# Patient Record
Sex: MALE | Race: BLACK OR AFRICAN AMERICAN | NOT HISPANIC OR LATINO
[De-identification: names, ages, dates, MRNs, and addresses within clinical notes are randomized per-mention and may not be internally consistent; named-entity substitution may affect disease eponyms.]

---

## 2017-02-03 ENCOUNTER — TRANSCRIPTION ENCOUNTER (OUTPATIENT)
Age: 82
End: 2017-02-03

## 2017-03-22 ENCOUNTER — APPOINTMENT (OUTPATIENT)
Dept: PULMONOLOGY | Facility: CLINIC | Age: 82
End: 2017-03-22

## 2017-03-22 VITALS
WEIGHT: 125 LBS | DIASTOLIC BLOOD PRESSURE: 50 MMHG | HEIGHT: 66 IN | BODY MASS INDEX: 20.09 KG/M2 | RESPIRATION RATE: 12 BRPM | OXYGEN SATURATION: 99 % | TEMPERATURE: 98.3 F | HEART RATE: 88 BPM | SYSTOLIC BLOOD PRESSURE: 120 MMHG

## 2017-03-31 ENCOUNTER — APPOINTMENT (OUTPATIENT)
Dept: PULMONOLOGY | Facility: CLINIC | Age: 82
End: 2017-03-31

## 2017-04-05 ENCOUNTER — APPOINTMENT (OUTPATIENT)
Dept: PULMONOLOGY | Facility: CLINIC | Age: 82
End: 2017-04-05

## 2017-04-05 VITALS
OXYGEN SATURATION: 98 % | HEIGHT: 66 IN | DIASTOLIC BLOOD PRESSURE: 50 MMHG | HEART RATE: 81 BPM | WEIGHT: 125 LBS | TEMPERATURE: 98.3 F | SYSTOLIC BLOOD PRESSURE: 110 MMHG | BODY MASS INDEX: 20.09 KG/M2

## 2017-04-06 ENCOUNTER — FORM ENCOUNTER (OUTPATIENT)
Age: 82
End: 2017-04-06

## 2017-04-07 ENCOUNTER — OUTPATIENT (OUTPATIENT)
Dept: OUTPATIENT SERVICES | Facility: HOSPITAL | Age: 82
LOS: 1 days | End: 2017-04-07
Payer: COMMERCIAL

## 2017-04-07 DIAGNOSIS — Z78.9 OTHER SPECIFIED HEALTH STATUS: Chronic | ICD-10-CM

## 2017-04-07 DIAGNOSIS — Z41.9 ENCOUNTER FOR PROCEDURE FOR PURPOSES OTHER THAN REMEDYING HEALTH STATE, UNSPECIFIED: Chronic | ICD-10-CM

## 2017-04-07 PROCEDURE — 71250 CT THORAX DX C-: CPT

## 2017-04-07 PROCEDURE — 71250 CT THORAX DX C-: CPT | Mod: 26

## 2017-04-12 ENCOUNTER — APPOINTMENT (OUTPATIENT)
Dept: PULMONOLOGY | Facility: CLINIC | Age: 82
End: 2017-04-12

## 2019-07-26 ENCOUNTER — APPOINTMENT (OUTPATIENT)
Dept: PULMONOLOGY | Facility: CLINIC | Age: 84
End: 2019-07-26
Payer: MEDICARE

## 2019-07-26 VITALS
HEIGHT: 66 IN | DIASTOLIC BLOOD PRESSURE: 70 MMHG | TEMPERATURE: 83 F | SYSTOLIC BLOOD PRESSURE: 136 MMHG | OXYGEN SATURATION: 97 % | HEART RATE: 97 BPM

## 2019-07-26 PROCEDURE — 94727 GAS DIL/WSHOT DETER LNG VOL: CPT

## 2019-07-26 PROCEDURE — 94060 EVALUATION OF WHEEZING: CPT

## 2019-07-26 PROCEDURE — 99213 OFFICE O/P EST LOW 20 MIN: CPT | Mod: 25

## 2019-07-26 PROCEDURE — ZZZZZ: CPT

## 2019-07-26 PROCEDURE — 71046 X-RAY EXAM CHEST 2 VIEWS: CPT

## 2019-07-28 NOTE — PHYSICAL EXAM
[General Appearance - Well Developed] : well developed [Normal Appearance] : normal appearance [General Appearance - Well Nourished] : well nourished [No Deformities] : no deformities [Well Groomed] : well groomed [Eyelids - No Xanthelasma] : the eyelids demonstrated no xanthelasmas [Normal Conjunctiva] : the conjunctiva exhibited no abnormalities [General Appearance - In No Acute Distress] : no acute distress [Neck Cervical Mass (___cm)] : no neck mass was observed [Normal Oropharynx] : normal oropharynx [Neck Appearance] : the appearance of the neck was normal [Thyroid Nodule] : there were no palpable thyroid nodules [Jugular Venous Distention Increased] : there was no jugular-venous distention [Thyroid Diffuse Enlargement] : the thyroid was not enlarged [Heart Rate And Rhythm] : heart rate and rhythm were normal [Murmurs] : no murmurs present [Heart Sounds] : normal S1 and S2 [Respiration, Rhythm And Depth] : normal respiratory rhythm and effort [Exaggerated Use Of Accessory Muscles For Inspiration] : no accessory muscle use [Auscultation Breath Sounds / Voice Sounds] : lungs were clear to auscultation bilaterally [Abdomen Soft] : soft [Abdomen Tenderness] : non-tender [Abdomen Mass (___ Cm)] : no abdominal mass palpated [Abnormal Walk] : normal gait [Gait - Sufficient For Exercise Testing] : the gait was sufficient for exercise testing [Cyanosis, Localized] : no localized cyanosis [Nail Clubbing] : no clubbing of the fingernails [Petechial Hemorrhages (___cm)] : no petechial hemorrhages [] : no ischemic changes

## 2019-07-28 NOTE — DISCUSSION/SUMMARY
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

## 2019-07-28 NOTE — HISTORY OF PRESENT ILLNESS
[FreeTextEntry1] : patient is here for follow up complains of dyspnea,  last time I saw him there was radiologic emphysema by his spirometry was normal. inhaler did not help because he has normal flows. he is sent here again.  with dyspnea, inhalers have not helped

## 2020-06-22 ENCOUNTER — APPOINTMENT (OUTPATIENT)
Dept: SURGERY | Facility: CLINIC | Age: 85
End: 2020-06-22
Payer: MEDICARE

## 2020-06-22 VITALS
HEIGHT: 66 IN | SYSTOLIC BLOOD PRESSURE: 137 MMHG | TEMPERATURE: 96.7 F | DIASTOLIC BLOOD PRESSURE: 64 MMHG | BODY MASS INDEX: 19.31 KG/M2 | OXYGEN SATURATION: 99 % | HEART RATE: 79 BPM | WEIGHT: 120.13 LBS

## 2020-06-22 PROCEDURE — 99203 OFFICE O/P NEW LOW 30 MIN: CPT

## 2020-06-22 RX ORDER — SIMVASTATIN 80 MG/1
TABLET, FILM COATED ORAL
Refills: 0 | Status: ACTIVE | COMMUNITY

## 2020-06-22 RX ORDER — VALSARTAN 40 MG/1
TABLET, COATED ORAL
Refills: 0 | Status: ACTIVE | COMMUNITY

## 2020-07-31 NOTE — ASSESSMENT
[FreeTextEntry1] : This is an 84 year old male with a symptomatic inguinal hernia on the left. Prior repair on right. Some medical comorbid. We discussed the risks, benefits and alternatives to open repair of left inguinal hernia with mesh including but not limited to death, disability, need for additional procedures, chronic pain, numbness, scarring, loss of the testicle, infertility, blood clots, bleeding, infection, need for additional procedures and other issues. I answered all questions. The patient does wish to proceed with surgery. He may proceed after appropriate pre operative risk assesment. I answered all questions. Notably greater than 50% of todays 30 minute initial visit was spent on counseling and coordination of his care. \par

## 2020-07-31 NOTE — HISTORY OF PRESENT ILLNESS
[de-identified] : 84 year old male. History of prior hernia repair on the right side about 7-8 years ago. Over past few weeks noted the left side was causing some discomfort and noticed a bulge. Denies any obstructive symptoms. Still fairly active. Has been doing OK through the COVID pandemic. Wants to discuss repair of the right side.

## 2020-07-31 NOTE — PHYSICAL EXAM
[JVD] : no jugular venous distention  [Normal Breath Sounds] : Normal breath sounds [Normal Heart Sounds] : normal heart sounds [Purpura] : no purpura  [Alert] : alert [Oriented to Person] : oriented to person [Oriented to Place] : oriented to place [Oriented to Time] : oriented to time [Calm] : calm [de-identified] : MORENITA. Diane. Appropriate. Comfortable. [de-identified] : Pupils equal. No Scleral Icterus. NCAT. [de-identified] : Supple. Trachea midline. No overt lymphadenopathy. No JVD [de-identified] : Abdomen is soft, non tender and non distended. Do not appreciate any overt mass. There is a left inguinal hernia. Reducible. Not overtly tende.r

## 2020-08-31 ENCOUNTER — APPOINTMENT (OUTPATIENT)
Dept: SURGERY | Facility: CLINIC | Age: 85
End: 2020-08-31
Payer: MEDICARE

## 2020-08-31 VITALS
OXYGEN SATURATION: 99 % | SYSTOLIC BLOOD PRESSURE: 121 MMHG | WEIGHT: 120 LBS | TEMPERATURE: 98.2 F | DIASTOLIC BLOOD PRESSURE: 58 MMHG | BODY MASS INDEX: 19.29 KG/M2 | HEIGHT: 66 IN | HEART RATE: 88 BPM

## 2020-08-31 PROCEDURE — 99213 OFFICE O/P EST LOW 20 MIN: CPT

## 2020-08-31 NOTE — ASSESSMENT
[FreeTextEntry1] : This is an 84 year old male with a symptomatic inguinal hernia on the left. Prior repair on right. Some medical comorbid. We discussed the risks, benefits and alternatives to open repair of open left inguinal hernia with mesh including but not limited to death, disability, need for additional procedures, chronic pain, numbness, scarring, loss of the testicle, infertility, blood clots, bleeding, infection, need for additional procedures and other issues. I answered all questions. The patient does wish to proceed with surgery. He may proceed after appropriate pre operative risk assessment. I answered all questions. Notably greater than 50% of today's 15 minute follow up visit was spent on counseling and coordination of his care. He will likely proceed with surgery given his symptoms. He will figure out a time that works for him. We discussed appropriate pre operative risk assessment. \par

## 2020-08-31 NOTE — HISTORY OF PRESENT ILLNESS
[de-identified] : 84 year old male. History of prior hernia repair on the right side about 7-8 years ago. Over past few weeks noted the left side was causing some discomfort and noticed a bulge. Denies any obstructive symptoms. Still fairly active. Has been doing OK through the COVID pandemic. Wants to discuss repair of the left side.  [de-identified] : 8- - Returns today. Has given some though to hernia repair. Initially wasn’t going to do it, but now it has been bothering him more. He came to discuss further the options. Denies any major changes to his health. His main concern through recovery is the care of his wife who has medical issues, and finding some help from family members to help with groceries and heavy cases of drinking water that he purchases regularly. He has identified his grandson who might help him.

## 2020-08-31 NOTE — PHYSICAL EXAM
[Normal Breath Sounds] : Normal breath sounds [Normal Heart Sounds] : normal heart sounds [Alert] : alert [Oriented to Person] : oriented to person [Oriented to Place] : oriented to place [Oriented to Time] : oriented to time [Calm] : calm [JVD] : no jugular venous distention  [de-identified] : MORENITA. Diane. Appropriate. Comfortable. [Purpura] : no purpura  [de-identified] : Supple. Trachea midline. No overt lymphadenopathy. No JVD [de-identified] : Pupils equal. No Scleral Icterus. NCAT. [de-identified] : Abdomen is soft, non tender and non distended. Do not appreciate any overt mass. There is a left inguinal hernia. Reducible. Not overtly tender. Right sided incision is well healed. No evidence of recurrence.

## 2020-09-16 ENCOUNTER — LABORATORY RESULT (OUTPATIENT)
Age: 85
End: 2020-09-16

## 2020-09-17 ENCOUNTER — TRANSCRIPTION ENCOUNTER (OUTPATIENT)
Age: 85
End: 2020-09-17

## 2020-09-17 VITALS
OXYGEN SATURATION: 99 % | TEMPERATURE: 97 F | HEIGHT: 64 IN | HEART RATE: 76 BPM | WEIGHT: 120.15 LBS | DIASTOLIC BLOOD PRESSURE: 62 MMHG | RESPIRATION RATE: 16 BRPM | SYSTOLIC BLOOD PRESSURE: 127 MMHG

## 2020-09-18 ENCOUNTER — RESULT REVIEW (OUTPATIENT)
Age: 85
End: 2020-09-18

## 2020-09-18 ENCOUNTER — OUTPATIENT (OUTPATIENT)
Dept: OUTPATIENT SERVICES | Facility: HOSPITAL | Age: 85
LOS: 1 days | Discharge: HOME | End: 2020-09-18
Payer: MEDICARE

## 2020-09-18 VITALS
DIASTOLIC BLOOD PRESSURE: 66 MMHG | SYSTOLIC BLOOD PRESSURE: 161 MMHG | HEART RATE: 82 BPM | RESPIRATION RATE: 20 BRPM | OXYGEN SATURATION: 99 %

## 2020-09-18 DIAGNOSIS — Z98.890 OTHER SPECIFIED POSTPROCEDURAL STATES: Chronic | ICD-10-CM

## 2020-09-18 DIAGNOSIS — Z41.9 ENCOUNTER FOR PROCEDURE FOR PURPOSES OTHER THAN REMEDYING HEALTH STATE, UNSPECIFIED: Chronic | ICD-10-CM

## 2020-09-18 DIAGNOSIS — Z78.9 OTHER SPECIFIED HEALTH STATUS: Chronic | ICD-10-CM

## 2020-09-18 PROCEDURE — 49505 PRP I/HERN INIT REDUC >5 YR: CPT | Mod: LT

## 2020-09-18 PROCEDURE — 88302 TISSUE EXAM BY PATHOLOGIST: CPT | Mod: 26

## 2020-09-18 PROCEDURE — C1781: CPT

## 2020-09-18 PROCEDURE — 88302 TISSUE EXAM BY PATHOLOGIST: CPT

## 2020-09-18 RX ORDER — ACETAMINOPHEN 500 MG
650 TABLET ORAL EVERY 6 HOURS
Refills: 0 | Status: DISCONTINUED | OUTPATIENT
Start: 2020-09-18 | End: 2020-09-18

## 2020-09-18 RX ORDER — BUPIVACAINE 13.3 MG/ML
20 INJECTION, SUSPENSION, LIPOSOMAL INFILTRATION ONCE
Refills: 0 | Status: DISCONTINUED | OUTPATIENT
Start: 2020-09-18 | End: 2020-09-18

## 2020-09-18 NOTE — BRIEF OPERATIVE NOTE - OPERATION/FINDINGS
Open repair of left indirect inguinal hernia via 6cm incision along inguinal ligament. Bluntly dissected down to external oblique aponeurosis, then sharply divided. Ilioinguinal nerve identified and preserved. Spermatic cord dissected off hernia sac. Hernia sac suture ligated with 2-0 Vicryl x 1. Indirect inguinal hernia identified and reduced, and Vicryl mesh cut to size, secured with 2-0 Vicryl running sutures. External oblique ring secured with 2-0 Vicryl simple interrupted x 2. Closed external oblique and Prashanth's fascia with 2-0 Vicryl running suture in separate layers. Closed skin with 4-0 Vicryl deep dermal and 4-0 Monocryl running. Dressed with Dermabond.

## 2020-09-18 NOTE — BRIEF OPERATIVE NOTE - NSICDXBRIEFPROCEDURE_GEN_ALL_CORE_FT
PROCEDURES:  Open repair of inguinal hernia using mesh in adult 18-Sep-2020 17:50:56  Liilan Damon

## 2020-09-24 LAB — SURGICAL PATHOLOGY STUDY: SIGNIFICANT CHANGE UP

## 2020-09-28 ENCOUNTER — APPOINTMENT (OUTPATIENT)
Dept: SURGERY | Facility: CLINIC | Age: 85
End: 2020-09-28
Payer: MEDICARE

## 2020-09-28 VITALS
HEIGHT: 66 IN | WEIGHT: 122.25 LBS | OXYGEN SATURATION: 100 % | HEART RATE: 93 BPM | DIASTOLIC BLOOD PRESSURE: 68 MMHG | TEMPERATURE: 97.7 F | SYSTOLIC BLOOD PRESSURE: 111 MMHG | BODY MASS INDEX: 19.65 KG/M2

## 2020-09-28 PROCEDURE — 99024 POSTOP FOLLOW-UP VISIT: CPT

## 2020-09-29 NOTE — HISTORY OF PRESENT ILLNESS
[de-identified] : 84 year old male. History of prior hernia repair on the right side about 7-8 years ago. Over past few weeks noted the left side was causing some discomfort and noticed a bulge. Denies any obstructive symptoms. Still fairly active. Has been doing OK through the COVID pandemic. Wants to discuss repair of the left side.  [de-identified] : 8- - Returns today. Has given some though to hernia repair. Initially wasn’t going to do it, but now it has been bothering him more. He came to discuss further the options. Denies any major changes to his health. His main concern through recovery is the care of his wife who has medical issues, and finding some help from family members to help with groceries and heavy cases of drinking water that he purchases regularly. He has identified his grandson who might help him. \par \par 9- - Returns today. No POD 10 sp open left inguinal hernia repair with mesh. I spoke with him earlier in the week and he was having trouble with constipation. made some recommendations for OTC therapy. He reports that this worked well and he feels fine. No longer constipation. He is concerned the incision is hard. Denies any severe pain. Has been eaitng and drinking without issue.

## 2020-09-29 NOTE — ASSESSMENT
[FreeTextEntry1] : This is an 84 year old male with a symptomatic inguinal hernia on the left. Now s/p open repair. We discussed natural scar maturation - reassured the hardness is normal at this time. We discussed gradual return to normal activity. Would like to see him in about 6 weeks for follow up. I answered all questions.

## 2020-09-29 NOTE — PHYSICAL EXAM
[Normal Breath Sounds] : Normal breath sounds [Normal Heart Sounds] : normal heart sounds [Alert] : alert [Oriented to Person] : oriented to person [Oriented to Place] : oriented to place [Oriented to Time] : oriented to time [Calm] : calm [JVD] : no jugular venous distention  [Purpura] : no purpura  [de-identified] : MORENITA. Diane. Appropriate. Comfortable. [de-identified] : Pupils equal. No Scleral Icterus. NCAT. [de-identified] : Supple. Trachea midline. No overt lymphadenopathy. No JVD [de-identified] : Abdomen is soft, non tender and non distended. Do not appreciate any overt mass. The incision is healing well. No evidence of infection. There is a typical healing ridge.

## 2020-11-16 ENCOUNTER — APPOINTMENT (OUTPATIENT)
Dept: SURGERY | Facility: CLINIC | Age: 85
End: 2020-11-16
Payer: MEDICARE

## 2020-11-16 VITALS
OXYGEN SATURATION: 100 % | HEART RATE: 110 BPM | SYSTOLIC BLOOD PRESSURE: 145 MMHG | HEIGHT: 66 IN | TEMPERATURE: 97.3 F | WEIGHT: 122 LBS | DIASTOLIC BLOOD PRESSURE: 74 MMHG | BODY MASS INDEX: 19.61 KG/M2

## 2020-11-16 DIAGNOSIS — K46.9 UNSPECIFIED ABDOMINAL HERNIA W/OUT OBSTRUCTION OR GANGRENE: ICD-10-CM

## 2020-11-16 DIAGNOSIS — Z87.19 OTHER SPECIFIED POSTPROCEDURAL STATES: ICD-10-CM

## 2020-11-16 DIAGNOSIS — I65.29 OCCLUSION AND STENOSIS OF UNSPECIFIED CAROTID ARTERY: ICD-10-CM

## 2020-11-16 DIAGNOSIS — Z98.890 OTHER SPECIFIED POSTPROCEDURAL STATES: ICD-10-CM

## 2020-11-16 DIAGNOSIS — Z87.891 PERSONAL HISTORY OF NICOTINE DEPENDENCE: ICD-10-CM

## 2020-11-16 PROCEDURE — 99024 POSTOP FOLLOW-UP VISIT: CPT

## 2020-11-16 NOTE — PHYSICAL EXAM
[de-identified] : No acute distress. Appropriate. Comfortable [de-identified] : Normocephalic, atraumatic. No scleral icterus.  [de-identified] : Supple, no JVD or cervical lymphadenopathy.  [de-identified] : Normal breath sounds [de-identified] : S1S1 normal, regular rate and rhythm.  [de-identified] : +BS soft, nontender, nondistended. No hepatosplenomegaly. \par  \par

## 2020-11-16 NOTE — HISTORY OF PRESENT ILLNESS
[de-identified] : Patient is a 86 y/o male presents today for a follow up visit. Patient underwent open left inguinal hernia repair with mesh on 9/18/2020.   Patient saw Dr. Rojas on 10/14/2020 for a post operative visit in which he reported concerns of his incision feeling hard. Today, he states that he continues to be concerned about the hardness of the incision. Reports that the hardness has decreased in size since his last visit. Reports he has been eating and drinking with no issue. Denies any severe pain or constipation.

## 2020-11-16 NOTE — ASSESSMENT
[FreeTextEntry1] : Patient is a 84 y/o male who underwent a open left inguinal hernia repair with mesh . We discussed natural scar maturation with reassurance that hardness is normal at this time. We discussed gradual return to normal activity. All questions were answered. Patient was seen/discussed with attending, Dr. Rojas.

## 2020-11-16 NOTE — PLAN
[FreeTextEntry1] : Patient is a 84 y/o male who underwent a open left inguinal hernia repair on 9/18/2020. We discussed natural scar maturation with reassurance that hardness is normal at this time. We discussed gradual return to normal activity. All questions were answered. Patient was seen/discussed with attending, Dr. Rojas.

## 2020-12-01 ENCOUNTER — APPOINTMENT (OUTPATIENT)
Dept: OTOLARYNGOLOGY | Facility: CLINIC | Age: 85
End: 2020-12-01
Payer: MEDICARE

## 2020-12-01 VITALS
BODY MASS INDEX: 19.09 KG/M2 | HEART RATE: 106 BPM | HEIGHT: 66 IN | DIASTOLIC BLOOD PRESSURE: 72 MMHG | TEMPERATURE: 97.9 F | SYSTOLIC BLOOD PRESSURE: 192 MMHG | WEIGHT: 118.8 LBS

## 2020-12-01 VITALS — DIASTOLIC BLOOD PRESSURE: 70 MMHG | HEART RATE: 100 BPM | SYSTOLIC BLOOD PRESSURE: 131 MMHG

## 2020-12-01 DIAGNOSIS — R20.0 ANESTHESIA OF SKIN: ICD-10-CM

## 2020-12-01 DIAGNOSIS — Z78.9 OTHER SPECIFIED HEALTH STATUS: ICD-10-CM

## 2020-12-01 PROCEDURE — 99072 ADDL SUPL MATRL&STAF TM PHE: CPT

## 2020-12-01 PROCEDURE — 99204 OFFICE O/P NEW MOD 45 MIN: CPT

## 2020-12-01 NOTE — HISTORY OF PRESENT ILLNESS
[de-identified] : 85M here for initial evaluation.\par \par Around 1.5 to 2 months ago, he developed decreased sensation of his right lower lip. This all started after having a crown placed in that area by his dentist and has persisted, unchanged. There is no pain, bleeding or difficulty eating, breathing, swallowing or talking; there is no lip asymmetry or weakness. He returned to his dentist who gave course of amoxicillin and also peridex, but sx remain. He is here for further evaluation.\par \par ROS otherwise unremarkable.

## 2020-12-01 NOTE — PHYSICAL EXAM
[de-identified] : soft, no masses/lesions [Midline] : trachea located in midline position [de-identified] : no masses/lesions [de-identified] : mandibular alveolus mucosalized, nontender; large b/l (left>right) mandibular jovani [de-identified] : no masses/lesions, mucosa intact [de-identified] : no masses/lesions [Normal] : no rashes [FreeTextEntry2] : no masses/lesions [de-identified] : full facial movement; diminished sensation over right V3 by lower right lip and right sublabial mentum

## 2020-12-01 NOTE — CONSULT LETTER
[Dear  ___] : Dear  [unfilled], [Courtesy Letter:] : I had the pleasure of seeing your patient, [unfilled], in my office today. [Consult Closing:] : Thank you very much for allowing me to participate in the care of this patient.  If you have any questions, please do not hesitate to contact me. [Sincerely,] : Sincerely, [FreeTextEntry3] : David Bravo MD\par Department of Otolaryngology - Head and Neck Surgery\par [facility]

## 2021-07-07 ENCOUNTER — OUTPATIENT (OUTPATIENT)
Dept: OUTPATIENT SERVICES | Facility: HOSPITAL | Age: 86
LOS: 1 days | End: 2021-07-07
Payer: MEDICARE

## 2021-07-07 DIAGNOSIS — Z78.9 OTHER SPECIFIED HEALTH STATUS: Chronic | ICD-10-CM

## 2021-07-07 DIAGNOSIS — Z41.9 ENCOUNTER FOR PROCEDURE FOR PURPOSES OTHER THAN REMEDYING HEALTH STATE, UNSPECIFIED: Chronic | ICD-10-CM

## 2021-07-07 DIAGNOSIS — Z98.890 OTHER SPECIFIED POSTPROCEDURAL STATES: Chronic | ICD-10-CM

## 2021-07-07 DIAGNOSIS — R06.09 OTHER FORMS OF DYSPNEA: ICD-10-CM

## 2021-07-07 PROCEDURE — 78452 HT MUSCLE IMAGE SPECT MULT: CPT

## 2021-07-07 PROCEDURE — 93017 CV STRESS TEST TRACING ONLY: CPT

## 2021-07-07 PROCEDURE — 93016 CV STRESS TEST SUPVJ ONLY: CPT

## 2021-07-07 PROCEDURE — 93018 CV STRESS TEST I&R ONLY: CPT

## 2021-07-07 PROCEDURE — 78452 HT MUSCLE IMAGE SPECT MULT: CPT | Mod: 26

## 2021-07-07 PROCEDURE — A9505: CPT

## 2021-07-07 PROCEDURE — A9500: CPT

## 2022-02-08 ENCOUNTER — RESULT REVIEW (OUTPATIENT)
Age: 87
End: 2022-02-08

## 2022-02-08 ENCOUNTER — APPOINTMENT (OUTPATIENT)
Dept: ORTHOPEDIC SURGERY | Facility: CLINIC | Age: 87
End: 2022-02-08
Payer: MEDICARE

## 2022-02-08 ENCOUNTER — OUTPATIENT (OUTPATIENT)
Dept: OUTPATIENT SERVICES | Facility: HOSPITAL | Age: 87
LOS: 1 days | End: 2022-02-08
Payer: MEDICARE

## 2022-02-08 VITALS
HEIGHT: 66 IN | SYSTOLIC BLOOD PRESSURE: 120 MMHG | WEIGHT: 117 LBS | DIASTOLIC BLOOD PRESSURE: 60 MMHG | BODY MASS INDEX: 18.8 KG/M2 | HEART RATE: 99 BPM | OXYGEN SATURATION: 74 %

## 2022-02-08 DIAGNOSIS — Z41.9 ENCOUNTER FOR PROCEDURE FOR PURPOSES OTHER THAN REMEDYING HEALTH STATE, UNSPECIFIED: Chronic | ICD-10-CM

## 2022-02-08 DIAGNOSIS — Z78.9 OTHER SPECIFIED HEALTH STATUS: Chronic | ICD-10-CM

## 2022-02-08 DIAGNOSIS — Z98.890 OTHER SPECIFIED POSTPROCEDURAL STATES: Chronic | ICD-10-CM

## 2022-02-08 PROCEDURE — 73502 X-RAY EXAM HIP UNI 2-3 VIEWS: CPT | Mod: 26,LT

## 2022-02-08 PROCEDURE — 72020 X-RAY EXAM OF SPINE 1 VIEW: CPT

## 2022-02-08 PROCEDURE — 72020 X-RAY EXAM OF SPINE 1 VIEW: CPT | Mod: 26

## 2022-02-08 PROCEDURE — 73502 X-RAY EXAM HIP UNI 2-3 VIEWS: CPT

## 2022-02-08 PROCEDURE — 99204 OFFICE O/P NEW MOD 45 MIN: CPT

## 2022-02-08 RX ORDER — VALSARTAN AND HYDROCHLOROTHIAZIDE 160; 12.5 MG/1; MG/1
160-12.5 TABLET, FILM COATED ORAL
Qty: 90 | Refills: 0 | Status: ACTIVE | COMMUNITY
Start: 2021-08-26

## 2022-02-08 RX ORDER — LIDOCAINE HYDROCHLORIDE 20 MG/ML
2 SOLUTION ORAL; TOPICAL
Qty: 100 | Refills: 0 | Status: ACTIVE | COMMUNITY
Start: 2021-10-22

## 2022-02-08 RX ORDER — FLUTICASONE PROPIONATE 50 UG/1
50 SPRAY, METERED NASAL
Qty: 16 | Refills: 0 | Status: ACTIVE | COMMUNITY
Start: 2021-11-30

## 2022-02-08 NOTE — PHYSICAL EXAM
[de-identified] : General appearance: well nourished and hydrated, pleasant, alert and oriented x 3, cooperative.  Thin habitus.\par HEENT: normocephalic, EOM intact, wearing mask, external auditory canal clear.  \par Cardiovascular: no lower leg edema, no varicosities, dorsalis pedis pulses palpable and symmetric.  \par Lymphatics: no palpable lymphadenopathy, no lymphedema.  \par Neurologic: sensation is normal, no muscle weakness in upper or lower extremities, patella tendon reflexes present and symmetric.  \par Dermatologic: skin moist, warm, no rash.  \par Spine: cervical spine with normal lordosis and painless range of motion, thoracic spine with normal kyphosis and painless range of motion, lumbosacral spine with normal lordosis and restricted painless range of motion.  No tenderness to palpation along midline spine and paraspinal musculature.  Sacroiliac joints nontender bilaterally. Negative SLR and crossed SLR tests bilaterally.\par Gait: antalgic left.\par \par Limb lengths: clinically similar at the medial malleoli, 2-3mm long on the left at the heels\par \par Left hip:\par - Swelling: none\par - Ecchymosis: none\par - Erythema: none\par - Wounds: none\par - Tenderness: anterior\par - ROM: 100 flexion, 0 extension, 5 adduction, 20 abduction, 5 internal rotation, 30 external rotation\par - NICO: painful\par - FADIR: painful\par - Patrick: negative\par - Stinchfield: positive\par - Flexor power: 4+/5\par - Abductor power: 5/5 [de-identified] : A lateral view of the lumbosacral spine, weightbearing AP pelvis, and 2 additional views (frog lateral and false profile) of the hip were interpreted by me and reviewed with the patient.\par \par Location of imaging: [facility]\par Date of exam: 2/8/22\par \par Lumbar spine -- aortic and iliac vessel calcifications noted with vascular stents in place\par Alignment: normal\par Spondylosis: moderate/severe multilevel\par Listhesis: none\par Posterior elements: moderate/severe multilevel facet arthrosis\par \par Pelvic alignment: relative outlet. Bilateral large vessel calcifications noted\par \par Right hip --\par Alignment: normal\par Arthritis: mild\par Deformity: cam\par Osteonecrosis: none \par \par Left hip --\par Alignment: normal\par Arthritis: severe\par Deformity: cam\par Osteonecrosis: primary vs. secondary sclerotic and cystic change

## 2022-02-08 NOTE — HISTORY OF PRESENT ILLNESS
[Worsening] : worsening [___ yrs] : [unfilled] year(s) ago [8] : a current pain level of 8/10 [Sitting] : sitting [Standing] : standing [Constant] : ~He/She~ states the symptoms seem to be constant [de-identified] : 2/8/22: 87y/o male referred by Dr. Garay for evaluation of left hip pain and stiffness. Symptoms have been escalating for about 2 years. Treatments thus far have included PT, HEP, Tylenol, and activity modification. Continues to have severe left groin pain radiating into the left anterior thigh. Ambulatory tolerance now is about 1-2 blocks without assistive device, limited both by pain and cardiovascular fatigue. Recently obtained a walker but is embarrassed to use it. He is here to discuss KRISTINA.\par \par PMH sig for HTN, HLD, and dyspnea. Dyspnea was evaluated to be secondary to aging and deconditioning as per last note from Dr. Holland. Also has undergone bilateral iliac arterial stents and bilateral inguinal hernia repairs.\par \par He reports that his wife is in poor health and requires assistance with her medical appointments. He helps her to arrange these but is finding it more and more difficult to do so given his own physical restrictions. [Bending] : worsened by bending [Hip Movement] : worsened by hip movement [Walking] : worsened by walking [Acetaminophen] : relieved by acetaminophen [de-identified] : describes achy pain

## 2022-02-08 NOTE — CONSULT LETTER
[Dear  ___] : Dear  [unfilled], [Consult Letter:] : I had the pleasure of evaluating your patient, [unfilled]. [Please see my note below.] : Please see my note below. [Consult Closing:] : Thank you very much for allowing me to participate in the care of this patient.  If you have any questions, please do not hesitate to contact me. [Sincerely,] : Sincerely, [FreeTextEntry3] : Mook Hart MD\par Orthopaedic Surgery - Adult Reconstruction\par [facility]\par [address] [place] [address]\par p. [phone]\par f. [phone]

## 2022-02-08 NOTE — DISCUSSION/SUMMARY
[de-identified] : 85y/o male with left hip osteoarthritis\par - He is indicated for left total hip arthroplasty\par - We discussed the details of the procedure, the expected recovery period, and the expected outcome. We discussed the likelihood of satisfaction after complete recovery, and the potential causes of dissatisfaction. The importance of active patient participation in the rehabilitation protocol was emphasized, along with its influence on short and long-term outcomes. We discussed the risks, benefits, and alternatives of surgery at length. Specific risks of total hip replacement were discussed in detail. We discussed the risk of surgical site complications including but not limited to: surgical site infection, wound healing complications, bone fracture, tendon or ligament injury, neurovascular injury, hemorrhage, postoperative stiffness or instability, persistent pain, limb length discrepancy, and need for reoperation. We discussed surgical blood loss and the possible need for blood transfusion. We discussed the risk of perioperative medical complications, including but not limited to catheter-associated urinary tract infection, venous thromboembolism and other cardiopulmonary complications. We discussed anesthetic options and the risk of anesthesia-related complications. We discussed the potential benefits of surgery including the potential to improve the current clinical condition through operative intervention. I emphasized that there are alternatives to surgical intervention including continued conservative management, though such a course could yield less than optimal results in this particular patient. A model was used to demonstrate the operation and to discuss bearing surfaces of the implants. We discussed implant fixation methods; my plan would be to use fully cementless fixation in this case. We discussed the various surgical approaches to the hip; I think that an anterior approach would be appropriate in this case. We discussed the durability of prosthetic hips and limitations related to wear, osteolysis and loosening. All questions were answered to the patient's satisfaction. The patient was given a copy of my preoperative packet with additional information about the procedure. I asked the patient to either call back or schedule a followup appointment for any additional questions or concerns regarding the procedure.\par - Book for surgery at a convenient time; in about 2 months per his request so that he can organize his wife's medical care\par - Standard medical clearance preoperatively through Dr. Garay\par - Tylenol and celecoxib as needed

## 2022-02-14 DIAGNOSIS — M43.12 SPONDYLOLISTHESIS, CERVICAL REGION: ICD-10-CM

## 2022-02-14 DIAGNOSIS — M16.0 BILATERAL PRIMARY OSTEOARTHRITIS OF HIP: ICD-10-CM

## 2022-02-14 DIAGNOSIS — M47.816 SPONDYLOSIS WITHOUT MYELOPATHY OR RADICULOPATHY, LUMBAR REGION: ICD-10-CM

## 2022-04-25 ENCOUNTER — RESULT REVIEW (OUTPATIENT)
Age: 87
End: 2022-04-25

## 2022-04-25 ENCOUNTER — OUTPATIENT (OUTPATIENT)
Dept: OUTPATIENT SERVICES | Facility: HOSPITAL | Age: 87
LOS: 1 days | End: 2022-04-25
Payer: MEDICARE

## 2022-04-25 DIAGNOSIS — Z78.9 OTHER SPECIFIED HEALTH STATUS: Chronic | ICD-10-CM

## 2022-04-25 DIAGNOSIS — Z41.9 ENCOUNTER FOR PROCEDURE FOR PURPOSES OTHER THAN REMEDYING HEALTH STATE, UNSPECIFIED: Chronic | ICD-10-CM

## 2022-04-25 DIAGNOSIS — Z98.890 OTHER SPECIFIED POSTPROCEDURAL STATES: Chronic | ICD-10-CM

## 2022-04-25 PROCEDURE — 71046 X-RAY EXAM CHEST 2 VIEWS: CPT

## 2022-04-25 PROCEDURE — 71046 X-RAY EXAM CHEST 2 VIEWS: CPT | Mod: 26

## 2022-04-27 RX ORDER — POVIDONE-IODINE 5 %
1 AEROSOL (ML) TOPICAL ONCE
Refills: 0 | Status: COMPLETED | OUTPATIENT
Start: 2022-04-28 | End: 2022-04-28

## 2022-04-27 NOTE — H&P ADULT - NSHPPHYSICALEXAM_GEN_ALL_CORE
PE:  Decreased ROM secondary to pain. Rest of PE per medical clearance. MSK: + decreased ROM 2/2 pain, left hip   Skin warm and well perfused, no visible wounds/erythema/ecchymoses  EHL/FHL/TA/GS 5/5 motor strength bilateral lower extremities   SLT in tact to distal bilateral lower extremities   DP pulses palpable bilaterally    Remainder of exam per medical clearance note

## 2022-04-27 NOTE — H&P ADULT - NSHPLABSRESULTS_GEN_ALL_CORE
preop cbc/bmp/coags/ua wnl per medical clearance  Cr 1.11  EKG- SR, LAE  Preop chest x-ray wnl per clearance   covid negative   TTE 4/6/22 EF 65%  Stress MPI 7/7/21 no ischemia

## 2022-04-27 NOTE — H&P ADULT - PROBLEM SELECTOR PLAN 1
Admit to Orthopaedic Service.  Presents today for elective LEFT THR.   Pt medically stable and cleared for procedure today by Dr. Goetz (cardio) and Dr. Muñoz.

## 2022-04-27 NOTE — H&P ADULT - HISTORY OF PRESENT ILLNESS
85yo m c/o left hip pain x   Presents today for elective LEFT THR.  86M c/o left hip pain x chronic. Pt denies preceding trauma or injury. Pt states his pain radiates down his left lower extremity. He denies numbness/tingling of bilateral lower extremities. Pt takes Tylenol as needed for pain control. He does not ambulate with an assistive device at baseline. Denies DVT hx; denies CP, SOB, N/V, tactile fevers, calf pain.    Presents today for elective LEFT THR.

## 2022-04-27 NOTE — H&P ADULT - NSICDXPASTSURGICALHX_GEN_ALL_CORE_FT
PAST SURGICAL HISTORY:  History of hernia surgery     Presence of stent in artery     Surgery, elective Hydrocele

## 2022-04-27 NOTE — H&P ADULT - NSICDXPASTMEDICALHX_GEN_ALL_CORE_FT
PAST MEDICAL HISTORY:  Aortic valve regurgitation     Carotid stenosis left    COPD (chronic obstructive pulmonary disease)     Dermatophytosis of nail    High cholesterol     Hypertension     Inguinal hernia Right    Peripheral vascular disease     Raynaud phenomenon

## 2022-04-28 ENCOUNTER — APPOINTMENT (OUTPATIENT)
Dept: ORTHOPEDIC SURGERY | Facility: HOSPITAL | Age: 87
End: 2022-04-28

## 2022-04-28 ENCOUNTER — INPATIENT (INPATIENT)
Facility: HOSPITAL | Age: 87
LOS: 1 days | Discharge: HOME | DRG: 470 | End: 2022-04-30
Attending: ORTHOPAEDIC SURGERY | Admitting: ORTHOPAEDIC SURGERY
Payer: MEDICARE

## 2022-04-28 VITALS
HEART RATE: 66 BPM | TEMPERATURE: 97 F | RESPIRATION RATE: 16 BRPM | OXYGEN SATURATION: 97 % | DIASTOLIC BLOOD PRESSURE: 64 MMHG | SYSTOLIC BLOOD PRESSURE: 118 MMHG | WEIGHT: 108.25 LBS

## 2022-04-28 DIAGNOSIS — I10 ESSENTIAL (PRIMARY) HYPERTENSION: ICD-10-CM

## 2022-04-28 DIAGNOSIS — Z98.890 OTHER SPECIFIED POSTPROCEDURAL STATES: Chronic | ICD-10-CM

## 2022-04-28 DIAGNOSIS — Z78.9 OTHER SPECIFIED HEALTH STATUS: Chronic | ICD-10-CM

## 2022-04-28 DIAGNOSIS — E78.0 PURE HYPERCHOLESTEROLEMIA: ICD-10-CM

## 2022-04-28 DIAGNOSIS — M19.90 UNSPECIFIED OSTEOARTHRITIS, UNSPECIFIED SITE: ICD-10-CM

## 2022-04-28 DIAGNOSIS — Z41.9 ENCOUNTER FOR PROCEDURE FOR PURPOSES OTHER THAN REMEDYING HEALTH STATE, UNSPECIFIED: Chronic | ICD-10-CM

## 2022-04-28 PROCEDURE — 27130 TOTAL HIP ARTHROPLASTY: CPT | Mod: LT

## 2022-04-28 PROCEDURE — 72170 X-RAY EXAM OF PELVIS: CPT | Mod: 26

## 2022-04-28 DEVICE — STEM FEM AVENIR CMPL HA STD COL SZ 5: Type: IMPLANTABLE DEVICE | Site: LEFT | Status: FUNCTIONAL

## 2022-04-28 DEVICE — LINER ACET VIT E G7 NEUT SZ F 40MM: Type: IMPLANTABLE DEVICE | Site: LEFT | Status: FUNCTIONAL

## 2022-04-28 DEVICE — SCREW SELF TAP 6.5X30MM: Type: IMPLANTABLE DEVICE | Site: LEFT | Status: FUNCTIONAL

## 2022-04-28 DEVICE — SHELL ACET G7 OSSEOTI F HEMISPHR 4 HOLE 54MM: Type: IMPLANTABLE DEVICE | Site: LEFT | Status: FUNCTIONAL

## 2022-04-28 DEVICE — HEAD FEM 40MM -3.5MMBIOLOX DELTA CERAMIC: Type: IMPLANTABLE DEVICE | Site: LEFT | Status: FUNCTIONAL

## 2022-04-28 RX ORDER — POLYETHYLENE GLYCOL 3350 17 G/17G
17 POWDER, FOR SOLUTION ORAL AT BEDTIME
Refills: 0 | Status: DISCONTINUED | OUTPATIENT
Start: 2022-04-28 | End: 2022-04-30

## 2022-04-28 RX ORDER — ASPIRIN/CALCIUM CARB/MAGNESIUM 324 MG
81 TABLET ORAL
Refills: 0 | Status: DISCONTINUED | OUTPATIENT
Start: 2022-04-29 | End: 2022-04-30

## 2022-04-28 RX ORDER — PANTOPRAZOLE 40 MG/1
40 TABLET, DELAYED RELEASE ORAL
Qty: 30 | Refills: 0 | Status: ACTIVE | COMMUNITY
Start: 2022-04-28 | End: 1900-01-01

## 2022-04-28 RX ORDER — CEFAZOLIN SODIUM 1 G
2000 VIAL (EA) INJECTION EVERY 8 HOURS
Refills: 0 | Status: COMPLETED | OUTPATIENT
Start: 2022-04-28 | End: 2022-04-29

## 2022-04-28 RX ORDER — CELECOXIB 100 MG/1
100 CAPSULE ORAL TWICE DAILY
Qty: 42 | Refills: 0 | Status: ACTIVE | COMMUNITY
Start: 2022-04-28 | End: 1900-01-01

## 2022-04-28 RX ORDER — OXYCODONE HYDROCHLORIDE 5 MG/1
5 TABLET ORAL EVERY 4 HOURS
Refills: 0 | Status: DISCONTINUED | OUTPATIENT
Start: 2022-04-28 | End: 2022-04-30

## 2022-04-28 RX ORDER — APREPITANT 80 MG/1
40 CAPSULE ORAL ONCE
Refills: 0 | Status: COMPLETED | OUTPATIENT
Start: 2022-04-28 | End: 2022-04-28

## 2022-04-28 RX ORDER — ONDANSETRON 8 MG/1
4 TABLET, FILM COATED ORAL EVERY 6 HOURS
Refills: 0 | Status: DISCONTINUED | OUTPATIENT
Start: 2022-04-28 | End: 2022-04-30

## 2022-04-28 RX ORDER — HYDROCHLOROTHIAZIDE 25 MG
12.5 TABLET ORAL DAILY
Refills: 0 | Status: DISCONTINUED | OUTPATIENT
Start: 2022-04-29 | End: 2022-04-30

## 2022-04-28 RX ORDER — SENNA PLUS 8.6 MG/1
2 TABLET ORAL AT BEDTIME
Refills: 0 | Status: DISCONTINUED | OUTPATIENT
Start: 2022-04-28 | End: 2022-04-30

## 2022-04-28 RX ORDER — ACETAMINOPHEN 500 MG/1
500 TABLET ORAL
Qty: 180 | Refills: 2 | Status: ACTIVE | COMMUNITY
Start: 2022-04-28 | End: 1900-01-01

## 2022-04-28 RX ORDER — SIMVASTATIN 20 MG/1
20 TABLET, FILM COATED ORAL AT BEDTIME
Refills: 0 | Status: DISCONTINUED | OUTPATIENT
Start: 2022-04-28 | End: 2022-04-30

## 2022-04-28 RX ORDER — KETOROLAC TROMETHAMINE 30 MG/ML
15 SYRINGE (ML) INJECTION EVERY 6 HOURS
Refills: 0 | Status: DISCONTINUED | OUTPATIENT
Start: 2022-04-28 | End: 2022-04-28

## 2022-04-28 RX ORDER — PANTOPRAZOLE SODIUM 20 MG/1
40 TABLET, DELAYED RELEASE ORAL
Refills: 0 | Status: DISCONTINUED | OUTPATIENT
Start: 2022-04-28 | End: 2022-04-30

## 2022-04-28 RX ORDER — ASPIRIN 81 MG/1
81 TABLET ORAL
Qty: 60 | Refills: 0 | Status: ACTIVE | COMMUNITY
Start: 2022-04-28 | End: 1900-01-01

## 2022-04-28 RX ORDER — ACETAMINOPHEN 500 MG
975 TABLET ORAL EVERY 8 HOURS
Refills: 0 | Status: DISCONTINUED | OUTPATIENT
Start: 2022-04-28 | End: 2022-04-30

## 2022-04-28 RX ORDER — VALSARTAN 80 MG/1
160 TABLET ORAL DAILY
Refills: 0 | Status: DISCONTINUED | OUTPATIENT
Start: 2022-04-29 | End: 2022-04-30

## 2022-04-28 RX ORDER — CHLORHEXIDINE GLUCONATE 213 G/1000ML
1 SOLUTION TOPICAL ONCE
Refills: 0 | Status: COMPLETED | OUTPATIENT
Start: 2022-04-28 | End: 2022-04-28

## 2022-04-28 RX ORDER — CELECOXIB 200 MG/1
400 CAPSULE ORAL ONCE
Refills: 0 | Status: COMPLETED | OUTPATIENT
Start: 2022-04-28 | End: 2022-04-28

## 2022-04-28 RX ORDER — SODIUM CHLORIDE 9 MG/ML
1000 INJECTION, SOLUTION INTRAVENOUS
Refills: 0 | Status: DISCONTINUED | OUTPATIENT
Start: 2022-04-29 | End: 2022-04-30

## 2022-04-28 RX ORDER — HYDROMORPHONE HYDROCHLORIDE 2 MG/ML
0.5 INJECTION INTRAMUSCULAR; INTRAVENOUS; SUBCUTANEOUS
Refills: 0 | Status: DISCONTINUED | OUTPATIENT
Start: 2022-04-28 | End: 2022-04-30

## 2022-04-28 RX ORDER — ACETAMINOPHEN 500 MG
1000 TABLET ORAL ONCE
Refills: 0 | Status: COMPLETED | OUTPATIENT
Start: 2022-04-28 | End: 2022-04-28

## 2022-04-28 RX ORDER — OXYCODONE HYDROCHLORIDE 5 MG/1
10 TABLET ORAL EVERY 4 HOURS
Refills: 0 | Status: DISCONTINUED | OUTPATIENT
Start: 2022-04-28 | End: 2022-04-30

## 2022-04-28 RX ORDER — HYDROMORPHONE HYDROCHLORIDE 2 MG/ML
0.5 INJECTION INTRAMUSCULAR; INTRAVENOUS; SUBCUTANEOUS EVERY 4 HOURS
Refills: 0 | Status: DISCONTINUED | OUTPATIENT
Start: 2022-04-28 | End: 2022-04-30

## 2022-04-28 RX ORDER — TRAMADOL HYDROCHLORIDE 50 MG/1
50 TABLET, COATED ORAL
Qty: 60 | Refills: 0 | Status: ACTIVE | COMMUNITY
Start: 2022-04-28 | End: 1900-01-01

## 2022-04-28 RX ORDER — CELECOXIB 200 MG/1
100 CAPSULE ORAL EVERY 12 HOURS
Refills: 0 | Status: DISCONTINUED | OUTPATIENT
Start: 2022-04-28 | End: 2022-04-30

## 2022-04-28 RX ADMIN — Medication 975 MILLIGRAM(S): at 21:25

## 2022-04-28 RX ADMIN — Medication 1 APPLICATION(S): at 08:13

## 2022-04-28 RX ADMIN — Medication 100 MILLIGRAM(S): at 19:15

## 2022-04-28 RX ADMIN — OXYCODONE HYDROCHLORIDE 5 MILLIGRAM(S): 5 TABLET ORAL at 20:38

## 2022-04-28 RX ADMIN — APREPITANT 40 MILLIGRAM(S): 80 CAPSULE ORAL at 08:12

## 2022-04-28 RX ADMIN — CHLORHEXIDINE GLUCONATE 1 APPLICATION(S): 213 SOLUTION TOPICAL at 08:13

## 2022-04-28 RX ADMIN — OXYCODONE HYDROCHLORIDE 5 MILLIGRAM(S): 5 TABLET ORAL at 21:00

## 2022-04-28 RX ADMIN — SIMVASTATIN 20 MILLIGRAM(S): 20 TABLET, FILM COATED ORAL at 21:25

## 2022-04-28 RX ADMIN — Medication 1000 MILLIGRAM(S): at 08:12

## 2022-04-28 RX ADMIN — Medication 975 MILLIGRAM(S): at 21:47

## 2022-04-28 RX ADMIN — CELECOXIB 400 MILLIGRAM(S): 200 CAPSULE ORAL at 08:12

## 2022-04-28 NOTE — PROGRESS NOTE ADULT - SUBJECTIVE AND OBJECTIVE BOX
Ortho Post Op Check    Procedure:  L Anterior KRISTINA   Surgeon: Dr. MAGNUS Hart    Pt comfortable without complaints, pain controlled  Denies CP, SOB, N/V, numbness/tingling     Vital Signs Last 24 Hrs  T(C): 36.8 (04-28-22 @ 20:22), Max: 36.8 (04-28-22 @ 20:22)  T(F): 98.2 (04-28-22 @ 20:22), Max: 98.2 (04-28-22 @ 20:22)  HR: 77 (04-28-22 @ 20:22) (52 - 92)  BP: 154/69 (04-28-22 @ 20:22) (116/58 - 175/76)  BP(mean): 84 (04-28-22 @ 17:30) (83 - 85)  RR: 20 (04-28-22 @ 20:22) (10 - 20)  SpO2: 95% (04-28-22 @ 20:22) (95% - 100%)    General: Pt Alert and oriented, NAD  MIKI DSG C/D/I  Pulses: 2+ DP  Sensation: SILT grossly SPN/DPN/Saph/Kevin/Tib  Motor: 5/5 TA/GS/EHL, Quad/Psoas firing limited 2/2 pain    Post-op X-Ray: hardware intact w/o fracture    A/P: 86yMale s/p L Anterior KRISTINA by Dr. MAGNUS Hart on 04-28  - Stable  - Pain Control  - DVT ppx: ASA 81 BID  - Post op abx: Ancef  - WBS: WBAT  - HPT    Ortho Pager [phone]

## 2022-04-28 NOTE — PHYSICAL THERAPY INITIAL EVALUATION ADULT - PERTINENT HX OF CURRENT PROBLEM, REHAB EVAL
86M c/o left hip pain x chronic. Pt denies preceding trauma or injury. Pt states his pain radiates down his left lower extremity. He denies numbness/tingling of bilateral lower extremities. Pt takes Tylenol as needed for pain control. He does not ambulate with an assistive device at baseline. Denies DVT hx; denies CP, SOB, N/V, tactile fevers, calf pain.

## 2022-04-28 NOTE — ASU PATIENT PROFILE, ADULT - FALL HARM RISK - UNIVERSAL INTERVENTIONS
Bed in lowest position, wheels locked, appropriate side rails in place/Call bell, personal items and telephone in reach/Instruct patient to call for assistance before getting out of bed or chair/Non-slip footwear when patient is out of bed/[place] to call system/Physically safe environment - no spills, clutter or unnecessary equipment/Purposeful Proactive Rounding/Room/bathroom lighting operational, light cord in reach

## 2022-04-28 NOTE — ASU PATIENT PROFILE, ADULT - NSICDXPASTSURGICALHX_GEN_ALL_CORE_FT
PAST SURGICAL HISTORY:  History of hernia surgery BILAT INGUINAL    Presence of stent in artery LEFT LEG    Surgery, elective Hydrocele

## 2022-04-28 NOTE — PHYSICAL THERAPY INITIAL EVALUATION ADULT - ADDITIONAL COMMENTS
Patient lives with spouse in elevator accessible apartment. Does not use any Assistive Devices at baseline. Owns rollator. Denies recent Hx of falls.

## 2022-04-28 NOTE — PHYSICAL THERAPY INITIAL EVALUATION ADULT - GENERAL OBSERVATIONS, REHAB EVAL
Patient received semi-miller in bed  in NAD on RA, +SCDs, +PIV, +Telemetry, +MIKI. Cleared by VERÓNICA Coyne. Agreeable to PT.

## 2022-04-28 NOTE — ASU PATIENT PROFILE, ADULT - NSICDXPASTMEDICALHX_GEN_ALL_CORE_FT
PAST MEDICAL HISTORY:  Aortic valve regurgitation     Carotid stenosis left    COPD (chronic obstructive pulmonary disease)     Dermatophytosis of nail    High cholesterol     Hypertension     Inguinal hernia Right    Osteoarthritis     Peripheral vascular disease     Raynaud phenomenon

## 2022-04-29 ENCOUNTER — TRANSCRIPTION ENCOUNTER (OUTPATIENT)
Age: 87
End: 2022-04-29

## 2022-04-29 LAB
ANION GAP SERPL CALC-SCNC: 9 MMOL/L — SIGNIFICANT CHANGE UP (ref 5–17)
BUN SERPL-MCNC: 34 MG/DL — HIGH (ref 7–23)
CALCIUM SERPL-MCNC: 8.4 MG/DL — SIGNIFICANT CHANGE UP (ref 8.4–10.5)
CHLORIDE SERPL-SCNC: 102 MMOL/L — SIGNIFICANT CHANGE UP (ref 96–108)
CO2 SERPL-SCNC: 25 MMOL/L — SIGNIFICANT CHANGE UP (ref 22–31)
CREAT SERPL-MCNC: 1.15 MG/DL — SIGNIFICANT CHANGE UP (ref 0.5–1.3)
EGFR: 63 ML/MIN/1.73M2 — SIGNIFICANT CHANGE UP
GLUCOSE SERPL-MCNC: 141 MG/DL — HIGH (ref 70–99)
HCT VFR BLD CALC: 29.9 % — LOW (ref 39–50)
HGB BLD-MCNC: 9.9 G/DL — LOW (ref 13–17)
MCHC RBC-ENTMCNC: 33.1 GM/DL — SIGNIFICANT CHANGE UP (ref 32–36)
MCHC RBC-ENTMCNC: 33.4 PG — SIGNIFICANT CHANGE UP (ref 27–34)
MCV RBC AUTO: 101 FL — HIGH (ref 80–100)
NRBC # BLD: 0 /100 WBCS — SIGNIFICANT CHANGE UP (ref 0–0)
PLATELET # BLD AUTO: 145 K/UL — LOW (ref 150–400)
POTASSIUM SERPL-MCNC: 4.7 MMOL/L — SIGNIFICANT CHANGE UP (ref 3.5–5.3)
POTASSIUM SERPL-SCNC: 4.7 MMOL/L — SIGNIFICANT CHANGE UP (ref 3.5–5.3)
RBC # BLD: 2.96 M/UL — LOW (ref 4.2–5.8)
RBC # FLD: 12.7 % — SIGNIFICANT CHANGE UP (ref 10.3–14.5)
SODIUM SERPL-SCNC: 136 MMOL/L — SIGNIFICANT CHANGE UP (ref 135–145)
WBC # BLD: 5.48 K/UL — SIGNIFICANT CHANGE UP (ref 3.8–10.5)
WBC # FLD AUTO: 5.48 K/UL — SIGNIFICANT CHANGE UP (ref 3.8–10.5)

## 2022-04-29 PROCEDURE — 99223 1ST HOSP IP/OBS HIGH 75: CPT

## 2022-04-29 RX ORDER — POLYETHYLENE GLYCOL 3350 17 G/17G
17 POWDER, FOR SOLUTION ORAL
Refills: 0 | Status: DISCONTINUED | OUTPATIENT
Start: 2022-04-29 | End: 2022-04-30

## 2022-04-29 RX ADMIN — CELECOXIB 100 MILLIGRAM(S): 200 CAPSULE ORAL at 05:03

## 2022-04-29 RX ADMIN — Medication 100 MILLIGRAM(S): at 01:41

## 2022-04-29 RX ADMIN — Medication 975 MILLIGRAM(S): at 05:03

## 2022-04-29 RX ADMIN — Medication 975 MILLIGRAM(S): at 23:13

## 2022-04-29 RX ADMIN — Medication 975 MILLIGRAM(S): at 22:13

## 2022-04-29 RX ADMIN — Medication 975 MILLIGRAM(S): at 14:30

## 2022-04-29 RX ADMIN — Medication 81 MILLIGRAM(S): at 18:10

## 2022-04-29 RX ADMIN — POLYETHYLENE GLYCOL 3350 17 GRAM(S): 17 POWDER, FOR SOLUTION ORAL at 22:12

## 2022-04-29 RX ADMIN — POLYETHYLENE GLYCOL 3350 17 GRAM(S): 17 POWDER, FOR SOLUTION ORAL at 18:10

## 2022-04-29 RX ADMIN — POLYETHYLENE GLYCOL 3350 17 GRAM(S): 17 POWDER, FOR SOLUTION ORAL at 13:52

## 2022-04-29 RX ADMIN — OXYCODONE HYDROCHLORIDE 5 MILLIGRAM(S): 5 TABLET ORAL at 16:48

## 2022-04-29 RX ADMIN — Medication 12.5 MILLIGRAM(S): at 05:02

## 2022-04-29 RX ADMIN — Medication 81 MILLIGRAM(S): at 05:03

## 2022-04-29 RX ADMIN — PANTOPRAZOLE SODIUM 40 MILLIGRAM(S): 20 TABLET, DELAYED RELEASE ORAL at 05:03

## 2022-04-29 RX ADMIN — VALSARTAN 160 MILLIGRAM(S): 80 TABLET ORAL at 06:57

## 2022-04-29 RX ADMIN — SIMVASTATIN 20 MILLIGRAM(S): 20 TABLET, FILM COATED ORAL at 22:12

## 2022-04-29 RX ADMIN — Medication 975 MILLIGRAM(S): at 06:00

## 2022-04-29 RX ADMIN — CELECOXIB 100 MILLIGRAM(S): 200 CAPSULE ORAL at 05:45

## 2022-04-29 RX ADMIN — CELECOXIB 100 MILLIGRAM(S): 200 CAPSULE ORAL at 18:10

## 2022-04-29 RX ADMIN — Medication 975 MILLIGRAM(S): at 13:52

## 2022-04-29 NOTE — DISCHARGE NOTE PROVIDER - NSDCACTIVITY_GEN_ALL_CORE
Do not drive or operate machinery/Do not make important decisions/No heavy lifting/straining Do not drive or operate machinery/Do not make important decisions/Walking - Indoors allowed/No heavy lifting/straining/Follow Instructions Provided by your Surgical Team

## 2022-04-29 NOTE — DISCHARGE NOTE PROVIDER - NSDCCPTREATMENT_GEN_ALL_CORE_FT
PRINCIPAL PROCEDURE  Procedure: Replacement, hip, anterior approach  Findings and Treatment: Left

## 2022-04-29 NOTE — CONSULT NOTE ADULT - SUBJECTIVE AND OBJECTIVE BOX
Patient is a 86y old  Male who presents with a chief complaint of left hip pain (29 Apr 2022 12:11)        HPI : 86 year old male admitted to the hospital for Left Hip Arthroplasty , Internal Medicine was consulted for medical management , patient has multiple medical issues , according to him all have been under control for the past few years. His only complaint now is left hip pain . He denies shortness of breath , chest pain , fever , chills , lightheadedness , dizziness , localized weakness, diplopia , nausea , abd pain , vomiting , diarrhea , dysuria, dysphagia , cough       REVIEW OF SYSTEMS: 12 point review of systems negative except those stated else where in the note       PAST MEDICAL & SURGICAL HISTORY:  Hypertension    High cholesterol    Peripheral vascular disease    Raynaud phenomenon    Aortic valve regurgitation    COPD (chronic obstructive pulmonary disease)    Carotid stenosis  left    Dermatophytosis  of nail    Inguinal hernia  Right    Osteoarthritis    Presence of stent in artery  LEFT LEG    Surgery, elective  Hydrocele    History of hernia surgery  BILAT INGUINAL        Social History:  denies tobacco use  + EtOH use - 2 drinks daily (27 Apr 2022 09:26)      FAMILY HISTORY: No family hx of Early CAD , Stroke       Home Medications:  simvastatin 20 mg oral tablet: 1 tab(s) orally once a day (at bedtime) (28 Apr 2022 08:06)  Tylenol 500 mg oral tablet: orally prn, As Needed (28 Apr 2022 08:06)  valsartan-hydrochlorothiazide 160 mg-12.5 mg oral tablet: 1 tab(s) orally once a day (28 Apr 2022 08:06)      MEDICATIONS  (STANDING):  acetaminophen     Tablet .. 975 milliGRAM(s) Oral every 8 hours  aspirin enteric coated 81 milliGRAM(s) Oral two times a day  celecoxib 100 milliGRAM(s) Oral every 12 hours  hydrochlorothiazide 12.5 milliGRAM(s) Oral daily  lactated ringers. 1000 milliLiter(s) (85 mL/Hr) IV Continuous <Continuous>  pantoprazole    Tablet 40 milliGRAM(s) Oral before breakfast  polyethylene glycol 3350 17 Gram(s) Oral at bedtime  polyethylene glycol 3350 17 Gram(s) Oral two times a day  senna 2 Tablet(s) Oral at bedtime  simvastatin 20 milliGRAM(s) Oral at bedtime  valsartan 160 milliGRAM(s) Oral daily    MEDICATIONS  (PRN):  HYDROmorphone  Injectable 0.5 milliGRAM(s) IV Push every 4 hours PRN breakthrough pain  HYDROmorphone  Injectable 0.5 milliGRAM(s) IV Push every 15 minutes PRN breakthrough pain  ondansetron Injectable 4 milliGRAM(s) IV Push every 6 hours PRN Nausea and/or Vomiting  oxyCODONE    IR 5 milliGRAM(s) Oral every 4 hours PRN Moderate Pain (4 - 6)  oxyCODONE    IR 10 milliGRAM(s) Oral every 4 hours PRN Severe Pain (7 - 10)      Vital Signs Last 24 Hrs  T(C): 36.1 (29 Apr 2022 13:02), Max: 36.8 (28 Apr 2022 20:22)  T(F): 97 (29 Apr 2022 13:02), Max: 98.3 (29 Apr 2022 04:44)  HR: 88 (29 Apr 2022 13:02) (44 - 92)  BP: 118/55 (29 Apr 2022 13:02) (109/53 - 175/76)  BP(mean): 84 (28 Apr 2022 17:30) (77 - 85)  RR: 17 (29 Apr 2022 13:02) (10 - 20)  SpO2: 100% (29 Apr 2022 13:02) (95% - 100%)      04-28-22 @ 07:01  -  04-29-22 @ 07:00  --------------------------------------------------------  IN: 850 mL / OUT: 500 mL / NET: 350 mL        LABS:                        9.9    5.48  )-----------( 145      ( 29 Apr 2022 05:24 )             29.9     04-29    136  |  102  |  34<H>  ----------------------------<  141<H>  4.7   |  25  |  1.15    Ca    8.4      29 Apr 2022 05:24      Imaging personally reviewed and radiologists report reviewed     Consultant(s) Notes Reviewed    PHYSICAL EXAM:  GENERAL: not in distress   HEAD, EYES: EOMI, PERRLA, conjunctiva and sclera clear  ENMT:  Moist mucous membranes, Good dentition, No lesions  NECK: Supple, No JVD, Normal thyroid  NERVOUS SYSTEM:  Alert & Oriented X3, Good concentration; Motor Strength 5/5 B/L upper and lower extremities; DTRs 2+ intact and symmetric  CHEST/LUNG: Clear to auscultation  bilaterally; No rales, rhonchi, wheezing,   HEART: Regular rate and rhythm; No murmurs, rubs, or gallops  ABDOMEN: Soft, Nontender, Nondistended; Bowel sounds present  EXTREMITIES:  2+ Peripheral Pulses, No clubbing, cyanosis, or edema  LYMPH: No lymphadenopathy noted  SKIN: No rashes or lesions    Care Discussed with Primary team

## 2022-04-29 NOTE — PROGRESS NOTE ADULT - SUBJECTIVE AND OBJECTIVE BOX
Ortho Floor Note    Subjective:  Pt comfortable without complaints, pain controlled. Was able to ambulate 50 feet with PT yesterday.  Denies CP, SOB, N/V, numbness/tingling     Vital Signs Last 24 Hrs  T(C): 36.8 (29 Apr 2022 04:44), Max: 36.8 (28 Apr 2022 20:22)  T(F): 98.3 (29 Apr 2022 04:44), Max: 98.3 (29 Apr 2022 04:44)  HR: 75 (29 Apr 2022 04:44) (44 - 92)  BP: 149/63 (29 Apr 2022 04:44) (102/54 - 175/76)  BP(mean): 84 (28 Apr 2022 17:30) (75 - 85)  RR: 18 (29 Apr 2022 04:44) (10 - 25)  SpO2: 95% (29 Apr 2022 04:44) (95% - 100%)    General: Pt Alert and oriented, NAD  MIKI DSG C/D/I  Pulses: 2+ DP  Sensation: SILT grossly SPN/DPN/Saph/Kevin/Tib  Motor: 5/5 TA/GS/EHL, Quad/Psoas firing limited 2/2 pain    A/P: 86yMale s/p L Anterior KRISTINA by Dr. MAGNUS Hart on 04-28  - Stable  - Pain Control  - DVT ppx: ASA 81 BID  - WBS: WBAT  - HPT    Ortho Pager [phone]

## 2022-04-29 NOTE — PROGRESS NOTE ADULT - SUBJECTIVE AND OBJECTIVE BOX
Ortho Note      Surgery: Left anterior KRISTINA POD #1  Patient seen and examined at bedside this AM   Patient endorses left hip pain   Feels constipated- no BM since Tuesday  Denies CP, SOB, N/V, numbness/tingling     Vital Signs Last 24 Hrs  T(C): 36 (04-29-22 @ 09:37), Max: 36 (04-29-22 @ 09:37)  T(F): 96.8 (04-29-22 @ 09:37), Max: 96.8 (04-29-22 @ 09:37)  HR: 85 (04-29-22 @ 09:37) (85 - 85)  BP: 116/56 (04-29-22 @ 09:37) (116/56 - 116/56)  BP(mean): --  RR: 18 (04-29-22 @ 09:37) (18 - 18)  SpO2: 100% (04-29-22 @ 09:37) (100% - 100%)  AVSS    General: Pt Alert and oriented, NAD  Dressing C/D/I: MIKI holding suction   Pulses: 2+ DP pulses bilateral LE   Sensation: intact to light touch bilateral LE   Motor: EHL/FHL/TA/GS 5/5 bilaterally         A/P: 86yMale POD#1 s/p L anterior KRISTINA       - Labs, VSS,   - Pain Control: IV and PO PRN   - DVT ppx: 81mg BID   - PT, WBS: WBAT  - Planned for home with HPT when optimized     Ortho Pager [phone] Ortho Note      Surgery: Left anterior KRISTINA POD #1  Patient seen and examined at bedside this AM   Patient endorses left hip pain   Feels constipated- no BM since Tuesday  Denies CP, SOB, N/V, numbness/tingling     Vital Signs Last 24 Hrs  T(C): 36 (04-29-22 @ 09:37), Max: 36 (04-29-22 @ 09:37)  T(F): 96.8 (04-29-22 @ 09:37), Max: 96.8 (04-29-22 @ 09:37)  HR: 85 (04-29-22 @ 09:37) (85 - 85)  BP: 116/56 (04-29-22 @ 09:37) (116/56 - 116/56)  BP(mean): --  RR: 18 (04-29-22 @ 09:37) (18 - 18)  SpO2: 100% (04-29-22 @ 09:37) (100% - 100%)  AVSS    General: Pt Alert and oriented, NAD  Dressing C/D/I: MIKI holding suction   Pulses: 2+ DP pulses bilateral LE   Sensation: intact to light touch bilateral LE   Motor: EHL/FHL/TA/GS 5/5 bilaterally         A/P: 86yMale POD#1 s/p L anterior KRISTINA       - Labs, VSS,   - Pain Control: IV and PO PRN   - DVT ppx: 81mg BID   - Bowel regimen, monitor for retention   - PT, WBS: WBAT  - Planned for home with HPT when optimized     Ortho Pager [phone]

## 2022-04-29 NOTE — DISCHARGE NOTE PROVIDER - NSDCCPCAREPLAN_GEN_ALL_CORE_FT
PRINCIPAL DISCHARGE DIAGNOSIS  Diagnosis: OA (osteoarthritis)  Assessment and Plan of Treatment: Left Hip

## 2022-04-29 NOTE — DISCHARGE NOTE PROVIDER - NSDCMRMEDTOKEN_GEN_ALL_CORE_FT
simvastatin 20 mg oral tablet: 1 tab(s) orally once a day (at bedtime)  Tylenol 500 mg oral tablet: orally prn, As Needed  valsartan-hydrochlorothiazide 160 mg-12.5 mg oral tablet: 1 tab(s) orally once a day   aspirin 81 mg oral delayed release tablet: 1 tab(s) orally 2 times a day MDD:2  oxyCODONE 5 mg oral tablet: 1 tab(s) orally every 4 hours, As needed, severe Pain (7 - 10) MDD:6  pantoprazole 40 mg oral delayed release tablet: 1 tab(s) orally once a day (before a meal)  senna oral tablet: 2 tab(s) orally once a day (at bedtime)  simvastatin 20 mg oral tablet: 1 tab(s) orally once a day (at bedtime)  Tylenol 500 mg oral tablet: orally prn, As Needed  valsartan 160 mg oral tablet: 1 tab(s) orally once a day  valsartan-hydrochlorothiazide 160 mg-12.5 mg oral tablet: 1 tab(s) orally once a day

## 2022-04-29 NOTE — DISCHARGE NOTE PROVIDER - HOSPITAL COURSE
Admit to Orthopaedics for Left Total Hip Replacement 4/28/22  Perioperative Antibiotics  DVT prophylaxis  Physical Therapy  Pain Management Admit to Orthopaedics for Left Total Hip Replacement 4/28/22  Perioperative Antibiotics  DVT prophylaxis  Physical Therapy  Pain Management   Med Consult

## 2022-04-29 NOTE — CONSULT NOTE ADULT - ASSESSMENT
86 year old male admitted to the hospital for Left Hip Replacement     Impression and Plan   # Left Hip Arthroplasty on 4/28  - pain control , DVT prophylaxis , Weightbearing status , Dressing changes and drain care per ortho team    # HTN   - continue valsartan and hctz , but will monitor serum creatinine , bun and orthostatic vitals , if any of them are abnormal would discontinue HCTZ     # HLD   -Continue simvastatin     # Raynaud Phenomenon  # Peripheral Vascular disease   # Carotid stenosis   -continue aspirin and statin     # Hx of Aortic valve Regurgitation     # COPD without exacerbation   -Duoneb PRN     # Daily alcohol use without hx of Alcohol withdrawal , intermediate risk for severe alcohol withdrawal , does not need CIWA-Ar Protocol

## 2022-04-29 NOTE — DISCHARGE NOTE PROVIDER - NSDCFUSCHEDAPPT_GEN_ALL_CORE_FT
Mook Hart  [facility] Physician [facility]  OrthoSurg [address]  Scheduled Appointment: 05/18/2022

## 2022-04-29 NOTE — PATIENT PROFILE ADULT - FALL HARM RISK - HARM RISK INTERVENTIONS

## 2022-04-29 NOTE — DISCHARGE NOTE PROVIDER - CARE PROVIDER_API CALL
Mook Hart)  Orthopedics  [address] [facility], [place] [address]  Phone: [phone]  Fax: [phone]  Follow Up Time:

## 2022-04-29 NOTE — DISCHARGE NOTE PROVIDER - NSDCFUADDINST_GEN_ALL_CORE_FT
You have a MIKI dressing. It is water resistant, but not waterproof. You may shower; however, the pump should be disconnected and placed in a safe location where it will not get wet.  No soaking in bathtubs.  The dressing has a battery that usually dies in 7 days. Once this occurs, you may remove the dressing and dispose of it, then leave incision open to air. Keep incision clean and dry.     No strenuous activity, heavy lifting, driving or returning to work until cleared by MD.  Please see Dr. Hart's attached discharge instructions       Swelling may travel all the way down leg to foot, this is normal and will subside in a few weeks.  Follow up with Dr. Hart to schedule an appt, if you have staples or sutures they will be removed in office.  Contact your doctor if you experience: fever greater than 101.5, chills, chest pain, difficulty breathing, redness or excessive drainage around the incision, other concerns.

## 2022-04-30 ENCOUNTER — TRANSCRIPTION ENCOUNTER (OUTPATIENT)
Age: 87
End: 2022-04-30

## 2022-04-30 VITALS
TEMPERATURE: 97 F | SYSTOLIC BLOOD PRESSURE: 120 MMHG | DIASTOLIC BLOOD PRESSURE: 58 MMHG | RESPIRATION RATE: 17 BRPM | HEART RATE: 69 BPM | OXYGEN SATURATION: 100 %

## 2022-04-30 LAB
ANION GAP SERPL CALC-SCNC: 10 MMOL/L — SIGNIFICANT CHANGE UP (ref 5–17)
BUN SERPL-MCNC: 40 MG/DL — HIGH (ref 7–23)
CALCIUM SERPL-MCNC: 8.8 MG/DL — SIGNIFICANT CHANGE UP (ref 8.4–10.5)
CHLORIDE SERPL-SCNC: 104 MMOL/L — SIGNIFICANT CHANGE UP (ref 96–108)
CO2 SERPL-SCNC: 23 MMOL/L — SIGNIFICANT CHANGE UP (ref 22–31)
CREAT SERPL-MCNC: 1.13 MG/DL — SIGNIFICANT CHANGE UP (ref 0.5–1.3)
EGFR: 64 ML/MIN/1.73M2 — SIGNIFICANT CHANGE UP
GLUCOSE SERPL-MCNC: 109 MG/DL — HIGH (ref 70–99)
HCT VFR BLD CALC: 30.7 % — LOW (ref 39–50)
HGB BLD-MCNC: 9.8 G/DL — LOW (ref 13–17)
MCHC RBC-ENTMCNC: 31.9 GM/DL — LOW (ref 32–36)
MCHC RBC-ENTMCNC: 33 PG — SIGNIFICANT CHANGE UP (ref 27–34)
MCV RBC AUTO: 103.4 FL — HIGH (ref 80–100)
NRBC # BLD: 0 /100 WBCS — SIGNIFICANT CHANGE UP (ref 0–0)
PLATELET # BLD AUTO: 139 K/UL — LOW (ref 150–400)
POTASSIUM SERPL-MCNC: 4.4 MMOL/L — SIGNIFICANT CHANGE UP (ref 3.5–5.3)
POTASSIUM SERPL-SCNC: 4.4 MMOL/L — SIGNIFICANT CHANGE UP (ref 3.5–5.3)
RBC # BLD: 2.97 M/UL — LOW (ref 4.2–5.8)
RBC # FLD: 13.1 % — SIGNIFICANT CHANGE UP (ref 10.3–14.5)
SODIUM SERPL-SCNC: 137 MMOL/L — SIGNIFICANT CHANGE UP (ref 135–145)
WBC # BLD: 6.27 K/UL — SIGNIFICANT CHANGE UP (ref 3.8–10.5)
WBC # FLD AUTO: 6.27 K/UL — SIGNIFICANT CHANGE UP (ref 3.8–10.5)

## 2022-04-30 PROCEDURE — 85027 COMPLETE CBC AUTOMATED: CPT

## 2022-04-30 PROCEDURE — 76000 FLUOROSCOPY <1 HR PHYS/QHP: CPT

## 2022-04-30 PROCEDURE — 36415 COLL VENOUS BLD VENIPUNCTURE: CPT

## 2022-04-30 PROCEDURE — 99232 SBSQ HOSP IP/OBS MODERATE 35: CPT

## 2022-04-30 PROCEDURE — 86900 BLOOD TYPING SEROLOGIC ABO: CPT

## 2022-04-30 PROCEDURE — 97116 GAIT TRAINING THERAPY: CPT

## 2022-04-30 PROCEDURE — 97161 PT EVAL LOW COMPLEX 20 MIN: CPT

## 2022-04-30 PROCEDURE — 86850 RBC ANTIBODY SCREEN: CPT

## 2022-04-30 PROCEDURE — 80048 BASIC METABOLIC PNL TOTAL CA: CPT

## 2022-04-30 PROCEDURE — 72170 X-RAY EXAM OF PELVIS: CPT

## 2022-04-30 PROCEDURE — C1713: CPT

## 2022-04-30 PROCEDURE — C1776: CPT

## 2022-04-30 PROCEDURE — 86901 BLOOD TYPING SEROLOGIC RH(D): CPT

## 2022-04-30 RX ORDER — PANTOPRAZOLE SODIUM 20 MG/1
1 TABLET, DELAYED RELEASE ORAL
Qty: 30 | Refills: 0
Start: 2022-04-30 | End: 2022-05-29

## 2022-04-30 RX ORDER — OXYCODONE HYDROCHLORIDE 5 MG/1
1 TABLET ORAL
Qty: 42 | Refills: 0
Start: 2022-04-30 | End: 2022-05-06

## 2022-04-30 RX ORDER — ASPIRIN/CALCIUM CARB/MAGNESIUM 324 MG
1 TABLET ORAL
Qty: 60 | Refills: 0
Start: 2022-04-30 | End: 2022-05-29

## 2022-04-30 RX ORDER — VALSARTAN 80 MG/1
1 TABLET ORAL
Qty: 0 | Refills: 0 | DISCHARGE
Start: 2022-04-30

## 2022-04-30 RX ORDER — SENNA PLUS 8.6 MG/1
2 TABLET ORAL
Qty: 0 | Refills: 0 | DISCHARGE
Start: 2022-04-30

## 2022-04-30 RX ADMIN — Medication 975 MILLIGRAM(S): at 06:57

## 2022-04-30 RX ADMIN — POLYETHYLENE GLYCOL 3350 17 GRAM(S): 17 POWDER, FOR SOLUTION ORAL at 06:00

## 2022-04-30 RX ADMIN — CELECOXIB 100 MILLIGRAM(S): 200 CAPSULE ORAL at 06:00

## 2022-04-30 RX ADMIN — Medication 975 MILLIGRAM(S): at 05:59

## 2022-04-30 RX ADMIN — PANTOPRAZOLE SODIUM 40 MILLIGRAM(S): 20 TABLET, DELAYED RELEASE ORAL at 05:59

## 2022-04-30 RX ADMIN — CELECOXIB 100 MILLIGRAM(S): 200 CAPSULE ORAL at 07:00

## 2022-04-30 RX ADMIN — Medication 81 MILLIGRAM(S): at 06:00

## 2022-04-30 NOTE — DISCHARGE NOTE NURSING/CASE MANAGEMENT/SOCIAL WORK - NSDCPEFALRISK_GEN_ALL_CORE
For information on Fall & Injury Prevention, visit: [email or web address] OR  [email or web address] OR  https://www.cdc.gov/steadi/patient.html

## 2022-04-30 NOTE — PROGRESS NOTE ADULT - SUBJECTIVE AND OBJECTIVE BOX
Patient is a 86y old  Male who presents with a chief complaint of left hip pain (29 Apr 2022 13:20)      INTERVAL HPI:  OVERNIGHT EVENTS:  T(F): 97.6 (04-30-22 @ 05:47), Max: 97.7 (04-29-22 @ 21:18)  HR: 70 (04-30-22 @ 05:47) (70 - 88)  BP: 119/58 (04-30-22 @ 05:47) (109/54 - 155/67)  RR: 17 (04-30-22 @ 05:47) (13 - 18)  SpO2: 98% (04-30-22 @ 05:47) (98% - 100%)  I&O's Summary    29 Apr 2022 07:01  -  30 Apr 2022 07:00  --------------------------------------------------------  IN: 0 mL / OUT: 1400 mL / NET: -1400 mL        REVIEW OF SYSTEMS:  CONSTITUTIONAL: No fever, weight loss, or fatigue  EYES: No eye pain, visual disturbances, or discharge  ENMT:  No difficulty hearing, tinnitus, vertigo; No sinus or throat pain  NECK: No pain or stiffness  BREASTS: No pain, masses, or nipple discharge  RESPIRATORY: No cough, wheezing, chills or hemoptysis; No shortness of breath  CARDIOVASCULAR: No chest pain, palpitations, dizziness, or leg swelling  GASTROINTESTINAL: No abdominal or epigastric pain. No nausea, vomiting, or hematemesis; No diarrhea or constipation. No melena or hematochezia.  GENITOURINARY: No dysuria, frequency, hematuria, or incontinence  NEUROLOGICAL: No headaches, memory loss, loss of strength, numbness, or tremors  SKIN: No itching, burning, rashes, or lesions   LYMPH NODES: No enlarged glands  ENDOCRINE: No heat or cold intolerance; No hair loss  MUSCULOSKELETAL: No joint pain or swelling; No muscle, back, or extremity pain  PSYCHIATRIC: No depression, anxiety, mood swings, or difficulty sleeping  HEME/LYMPH: No easy bruising, or bleeding gums  ALLERY AND IMMUNOLOGIC: No hives or eczema    PHYSICAL EXAM:  GENERAL: NAD, well-groomed, well-developed  HEAD:  Atraumatic, Normocephalic  EYES: EOMI, PERRLA, conjunctiva and sclera clear  ENMT: No tonsillar erythema, exudates, or enlargement; Moist mucous membranes, Good dentition, No lesions  NECK: Supple, No JVD, Normal thyroid  NERVOUS SYSTEM:  Alert & Oriented X3, Good concentration; Motor Strength 5/5 B/L upper and lower extremities; DTRs 2+ intact and symmetric  CHEST/LUNG: Clear to percussion bilaterally; No rales, rhonchi, wheezing, or rubs  HEART: Regular rate and rhythm; No murmurs, rubs, or gallops  ABDOMEN: Soft, Nontender, Nondistended; Bowel sounds present  EXTREMITIES:  2+ Peripheral Pulses, No clubbing, cyanosis, or edema  LYMPH: No lymphadenopathy noted  SKIN: No rashes or lesions    LABS:                        9.8    6.27  )-----------( 139      ( 30 Apr 2022 07:03 )             30.7     04-29    136  |  102  |  34<H>  ----------------------------<  141<H>  4.7   |  25  |  1.15    Ca    8.4      29 Apr 2022 05:24          CAPILLARY BLOOD GLUCOSE                  MEDICATIONS  (STANDING):  acetaminophen     Tablet .. 975 milliGRAM(s) Oral every 8 hours  aspirin enteric coated 81 milliGRAM(s) Oral two times a day  celecoxib 100 milliGRAM(s) Oral every 12 hours  hydrochlorothiazide 12.5 milliGRAM(s) Oral daily  lactated ringers. 1000 milliLiter(s) (85 mL/Hr) IV Continuous <Continuous>  pantoprazole    Tablet 40 milliGRAM(s) Oral before breakfast  polyethylene glycol 3350 17 Gram(s) Oral at bedtime  polyethylene glycol 3350 17 Gram(s) Oral two times a day  senna 2 Tablet(s) Oral at bedtime  simvastatin 20 milliGRAM(s) Oral at bedtime  valsartan 160 milliGRAM(s) Oral daily    MEDICATIONS  (PRN):  bisacodyl Suppository 10 milliGRAM(s) Rectal once PRN Constipation  HYDROmorphone  Injectable 0.5 milliGRAM(s) IV Push every 4 hours PRN breakthrough pain  HYDROmorphone  Injectable 0.5 milliGRAM(s) IV Push every 15 minutes PRN breakthrough pain  ondansetron Injectable 4 milliGRAM(s) IV Push every 6 hours PRN Nausea and/or Vomiting  oxyCODONE    IR 5 milliGRAM(s) Oral every 4 hours PRN Moderate Pain (4 - 6)  oxyCODONE    IR 10 milliGRAM(s) Oral every 4 hours PRN Severe Pain (7 - 10)       Patient is a 86y old  Male who presents with a chief complaint of left hip pain (29 Apr 2022 13:20)      INTERVAL HPI: Pt seen and examined. States he is feeling much better and looking forward to discharge, states pain is well controlled. Denies any other acute complaints at t his time.    OVERNIGHT EVENTS: none noted  T(F): 97.6 (04-30-22 @ 05:47), Max: 97.7 (04-29-22 @ 21:18)  HR: 70 (04-30-22 @ 05:47) (70 - 88)  BP: 119/58 (04-30-22 @ 05:47) (109/54 - 155/67)  RR: 17 (04-30-22 @ 05:47) (13 - 18)  SpO2: 98% (04-30-22 @ 05:47) (98% - 100%)  I&O's Summary    29 Apr 2022 07:01  -  30 Apr 2022 07:00  --------------------------------------------------------  IN: 0 mL / OUT: 1400 mL / NET: -1400 mL        REVIEW OF SYSTEMS: comprehensive ros negative except that stated in hpi    PHYSICAL EXAM:  GENERAL: not in distress   HEAD, EYES: EOMI, PERRLA, conjunctiva and sclera clear  ENMT:  Moist mucous membranes, Good dentition, No lesions  NECK: Supple, No JVD, Normal thyroid  NERVOUS SYSTEM:  Alert & Oriented X3, Good concentration; Motor Strength 5/5 B/L upper and lower extremities; DTRs 2+ intact and symmetric  CHEST/LUNG: Clear to auscultation  bilaterally; No rales, rhonchi, wheezing,   HEART: Regular rate and rhythm; No murmurs, rubs, or gallops  ABDOMEN: Soft, Nontender, Nondistended; Bowel sounds present  EXTREMITIES:  2+ Peripheral Pulses, No clubbing, cyanosis, or edema  LYMPH: No lymphadenopathy noted  SKIN: No rashes or lesions      LABS:                        9.8    6.27  )-----------( 139      ( 30 Apr 2022 07:03 )             30.7     04-29    136  |  102  |  34<H>  ----------------------------<  141<H>  4.7   |  25  |  1.15    Ca    8.4      29 Apr 2022 05:24          CAPILLARY BLOOD GLUCOSE                  MEDICATIONS  (STANDING):  acetaminophen     Tablet .. 975 milliGRAM(s) Oral every 8 hours  aspirin enteric coated 81 milliGRAM(s) Oral two times a day  celecoxib 100 milliGRAM(s) Oral every 12 hours  hydrochlorothiazide 12.5 milliGRAM(s) Oral daily  lactated ringers. 1000 milliLiter(s) (85 mL/Hr) IV Continuous <Continuous>  pantoprazole    Tablet 40 milliGRAM(s) Oral before breakfast  polyethylene glycol 3350 17 Gram(s) Oral at bedtime  polyethylene glycol 3350 17 Gram(s) Oral two times a day  senna 2 Tablet(s) Oral at bedtime  simvastatin 20 milliGRAM(s) Oral at bedtime  valsartan 160 milliGRAM(s) Oral daily    MEDICATIONS  (PRN):  bisacodyl Suppository 10 milliGRAM(s) Rectal once PRN Constipation  HYDROmorphone  Injectable 0.5 milliGRAM(s) IV Push every 4 hours PRN breakthrough pain  HYDROmorphone  Injectable 0.5 milliGRAM(s) IV Push every 15 minutes PRN breakthrough pain  ondansetron Injectable 4 milliGRAM(s) IV Push every 6 hours PRN Nausea and/or Vomiting  oxyCODONE    IR 5 milliGRAM(s) Oral every 4 hours PRN Moderate Pain (4 - 6)  oxyCODONE    IR 10 milliGRAM(s) Oral every 4 hours PRN Severe Pain (7 - 10)

## 2022-04-30 NOTE — PROGRESS NOTE ADULT - SUBJECTIVE AND OBJECTIVE BOX
Ortho    Subjective:  Pt comfortable without complaints, pain controlled.   Denies CP, SOB, N/V, numbness/tingling     Vital Signs Last 24 Hrs  T(C): 36.2 (30 Apr 2022 08:48), Max: 36.5 (29 Apr 2022 21:18)  T(F): 97.2 (30 Apr 2022 08:48), Max: 97.7 (29 Apr 2022 21:18)  HR: 69 (30 Apr 2022 08:48) (69 - 88)  BP: 120/58 (30 Apr 2022 08:48) (109/54 - 155/67)  BP(mean): 83 (30 Apr 2022 05:47) (77 - 83)  RR: 17 (30 Apr 2022 08:48) (13 - 18)  SpO2: 100% (30 Apr 2022 08:48) (98% - 100%)    General: Pt Alert and oriented, NAD  MIKI DSG C/D/I  Pulses: 2+ PT  Sensation: SILT grossly SPN/DPN/Saph/Kevin/Tib  Motor: 5/5 TA/GS/EHL,    A/P: 86yMale s/p L Anterior KRISTINA by Dr. MAGNUS Hart on 04-28  - Stable  - Pain Control  - DVT ppx: ASA 81 BID  - WBS: WBAT  - HPT    Ortho Pager [phone]

## 2022-04-30 NOTE — DISCHARGE NOTE NURSING/CASE MANAGEMENT/SOCIAL WORK - PATIENT PORTAL LINK FT
You can access the FollowMyHealth Patient Portal offered by [facility] by registering at the following website: [email or web address]. By joining [employer]’s FollowMyHealth portal, you will also be able to view your health information using other applications (apps) compatible with our system.

## 2022-04-30 NOTE — PROGRESS NOTE ADULT - ASSESSMENT
86 year old male admitted to the hospital for Left Hip Replacement     Impression and Plan   # Left Hip Arthroplasty on 4/28  - pain control , DVT prophylaxis , Weightbearing status , Dressing changes and drain care per ortho team    # HTN   - continue valsartan and hctz , but will monitor serum creatinine , bun and orthostatic vitals , if any of them are abnormal would discontinue HCTZ     # HLD   -Continue simvastatin     # Raynaud Phenomenon  # Peripheral Vascular disease   # Carotid stenosis   -continue aspirin and statin     # Hx of Aortic valve Regurgitation     # COPD without exacerbation   -Duoneb PRN     # Daily alcohol use without hx of Alcohol withdrawal , intermediate risk for severe alcohol withdrawal , does not need CIWA-Ar Protocol       Dispo: dc today with home care PT, pt reinforced importance of follow up within 1 week with PMD and ortho team for further care and maangement, pt verbalized understanding

## 2022-05-02 DIAGNOSIS — E78.00 PURE HYPERCHOLESTEROLEMIA, UNSPECIFIED: ICD-10-CM

## 2022-05-02 DIAGNOSIS — M16.12 UNILATERAL PRIMARY OSTEOARTHRITIS, LEFT HIP: ICD-10-CM

## 2022-05-02 DIAGNOSIS — I10 ESSENTIAL (PRIMARY) HYPERTENSION: ICD-10-CM

## 2022-05-18 ENCOUNTER — APPOINTMENT (OUTPATIENT)
Dept: ORTHOPEDIC SURGERY | Facility: CLINIC | Age: 87
End: 2022-05-18
Payer: MEDICARE

## 2022-05-18 VITALS — DIASTOLIC BLOOD PRESSURE: 69 MMHG | SYSTOLIC BLOOD PRESSURE: 140 MMHG

## 2022-05-18 PROBLEM — M19.90 UNSPECIFIED OSTEOARTHRITIS, UNSPECIFIED SITE: Chronic | Status: ACTIVE | Noted: 2022-04-27

## 2022-05-18 PROCEDURE — 99024 POSTOP FOLLOW-UP VISIT: CPT

## 2022-05-19 NOTE — HISTORY OF PRESENT ILLNESS
[de-identified] : First post op left total hip replacement, surgical date 4/28/22 [de-identified] : Doing well. Pain controlled with 0-1 oxycodone/day. Has been working with HPT. Using cane or walker. No wound or systemic issues. Does note some challenges with daily activities and would like scripts for some hip replacement accessories (shower stool, grabber etc) [de-identified] : L hip: incision well healed, benign appearing. Able to rise from seated to standing with bilateral arm push-off and ambulate with walker, mild left antalgia. [de-identified] : 87y/o male about 3wk s/p L KRISTINA [de-identified] : Begin OPT\par Wean opioids and assistive devices as tolerated\par Rxs given for shower stool and hip kit\par RTC 4wk with new left hip XRs

## 2022-05-26 ENCOUNTER — RX RENEWAL (OUTPATIENT)
Age: 87
End: 2022-05-26

## 2022-05-26 RX ORDER — CELECOXIB 100 MG/1
100 CAPSULE ORAL TWICE DAILY
Qty: 60 | Refills: 2 | Status: ACTIVE | COMMUNITY
Start: 2022-02-08 | End: 1900-01-01

## 2022-06-10 ENCOUNTER — OUTPATIENT (OUTPATIENT)
Dept: OUTPATIENT SERVICES | Facility: HOSPITAL | Age: 87
LOS: 1 days | End: 2022-06-10
Payer: MEDICARE

## 2022-06-10 ENCOUNTER — RESULT REVIEW (OUTPATIENT)
Age: 87
End: 2022-06-10

## 2022-06-10 ENCOUNTER — APPOINTMENT (OUTPATIENT)
Dept: ORTHOPEDIC SURGERY | Facility: CLINIC | Age: 87
End: 2022-06-10
Payer: MEDICARE

## 2022-06-10 VITALS — DIASTOLIC BLOOD PRESSURE: 61 MMHG | SYSTOLIC BLOOD PRESSURE: 135 MMHG

## 2022-06-10 DIAGNOSIS — Z78.9 OTHER SPECIFIED HEALTH STATUS: Chronic | ICD-10-CM

## 2022-06-10 DIAGNOSIS — Z41.9 ENCOUNTER FOR PROCEDURE FOR PURPOSES OTHER THAN REMEDYING HEALTH STATE, UNSPECIFIED: Chronic | ICD-10-CM

## 2022-06-10 DIAGNOSIS — Z98.890 OTHER SPECIFIED POSTPROCEDURAL STATES: Chronic | ICD-10-CM

## 2022-06-10 PROCEDURE — 99024 POSTOP FOLLOW-UP VISIT: CPT

## 2022-06-10 PROCEDURE — 73502 X-RAY EXAM HIP UNI 2-3 VIEWS: CPT | Mod: 26,LT

## 2022-06-10 PROCEDURE — 73502 X-RAY EXAM HIP UNI 2-3 VIEWS: CPT

## 2022-06-12 NOTE — HISTORY OF PRESENT ILLNESS
[de-identified] : Second post op for the left total hip replacement, surgical date 4/28/22. [de-identified] : 87 y/o male states he is doing well in PT. He states his pain is ok and is not taking any opioids for pain He ambulates with a walker today. [de-identified] : Limb lengths 2mm longer on L side\par Left hip: well healed surgical incision, benign appearing, minimal residual scabbing\par ROM\par Flexion 95\par Internal 15\par External 20\par Adduction 10\par Abduction 30\par Flexor power 4+/5\par Abductor 5/5 [de-identified] : Left hip XRs demonstrate intact KRISTINA, no complication [de-identified] : 87 y/o male 6 weeks post op L KRISTINA, doing well [de-identified] : - Cont PT\par - RTC in 2 months with Left hip XRs

## 2022-08-12 ENCOUNTER — APPOINTMENT (OUTPATIENT)
Dept: ORTHOPEDIC SURGERY | Facility: CLINIC | Age: 87
End: 2022-08-12

## 2022-08-12 ENCOUNTER — OUTPATIENT (OUTPATIENT)
Dept: OUTPATIENT SERVICES | Facility: HOSPITAL | Age: 87
LOS: 1 days | End: 2022-08-12
Payer: MEDICARE

## 2022-08-12 ENCOUNTER — RESULT REVIEW (OUTPATIENT)
Age: 87
End: 2022-08-12

## 2022-08-12 VITALS
OXYGEN SATURATION: 99 % | HEIGHT: 66 IN | BODY MASS INDEX: 18.8 KG/M2 | WEIGHT: 117 LBS | SYSTOLIC BLOOD PRESSURE: 136 MMHG | DIASTOLIC BLOOD PRESSURE: 61 MMHG | HEART RATE: 73 BPM

## 2022-08-12 DIAGNOSIS — M16.12 UNILATERAL PRIMARY OSTEOARTHRITIS, LEFT HIP: ICD-10-CM

## 2022-08-12 DIAGNOSIS — Z41.9 ENCOUNTER FOR PROCEDURE FOR PURPOSES OTHER THAN REMEDYING HEALTH STATE, UNSPECIFIED: Chronic | ICD-10-CM

## 2022-08-12 DIAGNOSIS — Z78.9 OTHER SPECIFIED HEALTH STATUS: Chronic | ICD-10-CM

## 2022-08-12 DIAGNOSIS — Z98.890 OTHER SPECIFIED POSTPROCEDURAL STATES: Chronic | ICD-10-CM

## 2022-08-12 PROCEDURE — 99213 OFFICE O/P EST LOW 20 MIN: CPT

## 2022-08-12 PROCEDURE — 73502 X-RAY EXAM HIP UNI 2-3 VIEWS: CPT

## 2022-08-12 PROCEDURE — 73502 X-RAY EXAM HIP UNI 2-3 VIEWS: CPT | Mod: 26,LT

## 2022-08-17 NOTE — PHYSICAL EXAM
[de-identified] : General appearance: well nourished and hydrated, pleasant, alert and oriented x 3, cooperative.  Thin habitus.\par HEENT: normocephalic, EOM intact, wearing mask, external auditory canal clear.  \par Cardiovascular: no lower leg edema, no varicosities, dorsalis pedis pulses palpable and symmetric.  \par Lymphatics: no palpable lymphadenopathy, no lymphedema.  \par Neurologic: sensation is normal, no muscle weakness in upper or lower extremities, patella tendon reflexes present and symmetric.  \par Dermatologic: skin moist, warm, no rash.  \par Spine: cervical spine with normal lordosis and painless range of motion, thoracic spine with normal kyphosis and painless range of motion, lumbosacral spine with normal lordosis and restricted painless range of motion.  No tenderness to palpation along midline spine and paraspinal musculature.  Sacroiliac joints nontender bilaterally. Negative SLR and crossed SLR tests bilaterally.\par Gait: cautious with cane, no specific antalgia\par \par Limb lengths: clinically similar\par \par Left hip:\par - Swelling: none\par - Ecchymosis: none\par - Erythema: none\par - Wounds: healed anterior incision, benign appearing\par - Tenderness: none\par - ROM: 100 flexion, 0 extension, 15 adduction, 40 abduction, 15 internal rotation, 45 external rotation\par - NICO: painless\par - FADIR: painless\par - Patrick: negative\par - Stinchfield: negative\par - Flexor power: 5/5\par - Abductor power: 5/5 [de-identified] : Left hip XRs demonstrate intact KRISTINA, no complication. \par

## 2022-08-17 NOTE — HISTORY OF PRESENT ILLNESS
[de-identified] : L KRISTINA 4/28/22\par \par 8/12/22: About 3.5mo s/p L KRISTINA. Doing well with respect to the hip but complains of some new shortness of breath with minimal exertion. Has a pulm appointment upcoming.\par \par 2/8/22: 87y/o male referred by Dr. Garay for evaluation of left hip pain and stiffness. Symptoms have been escalating for about 2 years. Treatments thus far have included PT, HEP, Tylenol, and activity modification. Continues to have severe left groin pain radiating into the left anterior thigh. Ambulatory tolerance now is about 1-2 blocks without assistive device, limited both by pain and cardiovascular fatigue. Recently obtained a walker but is embarrassed to use it. He is here to discuss KRISTINA.\par \par PMH sig for HTN, HLD, and dyspnea. Dyspnea was evaluated to be secondary to aging and deconditioning as per last note from Dr. Holland. Also has undergone bilateral iliac arterial stents and bilateral inguinal hernia repairs.\par \par He reports that his wife is in poor health and requires assistance with her medical appointments. He helps her to arrange these but is finding it more and more difficult to do so given his own physical restrictions.

## 2022-08-17 NOTE — DISCUSSION/SUMMARY
[de-identified] : 85y/o male about 3.5mo s/p L KRISTINA\par - Cont PT/HEP\par - RTC annually q April with repeat left hip XRs or earlier as needed

## 2022-09-15 ENCOUNTER — APPOINTMENT (OUTPATIENT)
Dept: PULMONOLOGY | Facility: CLINIC | Age: 87
End: 2022-09-15

## 2022-09-15 VITALS
TEMPERATURE: 98.2 F | HEART RATE: 85 BPM | OXYGEN SATURATION: 99 % | SYSTOLIC BLOOD PRESSURE: 110 MMHG | BODY MASS INDEX: 18.8 KG/M2 | DIASTOLIC BLOOD PRESSURE: 60 MMHG | HEIGHT: 66 IN | WEIGHT: 117 LBS

## 2022-09-15 DIAGNOSIS — Z23 ENCOUNTER FOR IMMUNIZATION: ICD-10-CM

## 2022-09-15 DIAGNOSIS — R06.00 DYSPNEA, UNSPECIFIED: ICD-10-CM

## 2022-09-15 DIAGNOSIS — J43.2 CENTRILOBULAR EMPHYSEMA: ICD-10-CM

## 2022-09-15 PROCEDURE — 90662 IIV NO PRSV INCREASED AG IM: CPT

## 2022-09-15 PROCEDURE — G0008: CPT

## 2022-09-27 LAB — SARS-COV-2 N GENE NPH QL NAA+PROBE: NOT DETECTED

## 2022-09-28 ENCOUNTER — APPOINTMENT (OUTPATIENT)
Dept: PULMONOLOGY | Facility: CLINIC | Age: 87
End: 2022-09-28

## 2022-09-28 PROCEDURE — 94060 EVALUATION OF WHEEZING: CPT

## 2022-09-28 PROCEDURE — 94727 GAS DIL/WSHOT DETER LNG VOL: CPT

## 2022-09-28 PROCEDURE — 94729 DIFFUSING CAPACITY: CPT

## 2022-09-28 NOTE — ASSESSMENT
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

## 2022-09-28 NOTE — HISTORY OF PRESENT ILLNESS
[TextBox_4] : 09/15/2022: PMD is Dr Garay. Initial visit for me; last seen by Dr Holland >3 years ago for emphysema and normal PFT. He had a hip replacement 4/2022. He feels dyspneic on walking. He walks slower than he used to. [facility] where he lives on [address]. He quit smoking 4 years ago from 1 ppd. He sees Dr Goetz; he denies any serious heart problem. Retired from 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 Has not had Covid, is vaccinated.\par  [ESS] : 0

## 2022-09-28 NOTE — CONSULT LETTER
[Dear  ___] : Dear  [unfilled], [( Thank you for referring [unfilled] for consultation for _____ )] : Thank you for referring [unfilled] for consultation for [unfilled] [Please see my note below.] : Please see my note below. [Consult Closing:] : Thank you very much for allowing me to participate in the care of this patient.  If you have any questions, please do not hesitate to contact me. [Sincerely,] : Sincerely, [FreeTextEntry2] : Lisa Garay MD\par [address] \[place] [address] [FreeTextEntry3] : Leela Johns MD, FCCP\par

## 2022-10-26 ENCOUNTER — NON-APPOINTMENT (OUTPATIENT)
Age: 87
End: 2022-10-26

## 2022-10-26 RX ORDER — AMOXICILLIN 500 MG/1
500 TABLET, FILM COATED ORAL
Qty: 4 | Refills: 1 | Status: ACTIVE | COMMUNITY
Start: 2022-10-26 | End: 1900-01-01

## 2023-06-19 NOTE — PHYSICAL THERAPY INITIAL EVALUATION ADULT - REHAB POTENTIAL, PT EVAL
Impression: S/P Cataract Extraction by phacoemulsification with IOL placement OS - 35 Days. Presence of intraocular lens  Z96.1. Plan: Monitor.
good, to achieve stated therapy goals

## 2023-08-11 ENCOUNTER — OUTPATIENT (OUTPATIENT)
Dept: OUTPATIENT SERVICES | Facility: HOSPITAL | Age: 88
LOS: 1 days | End: 2023-08-11
Payer: MEDICARE

## 2023-08-11 ENCOUNTER — RESULT REVIEW (OUTPATIENT)
Age: 88
End: 2023-08-11

## 2023-08-11 ENCOUNTER — APPOINTMENT (OUTPATIENT)
Dept: ORTHOPEDIC SURGERY | Facility: CLINIC | Age: 88
End: 2023-08-11
Payer: MEDICARE

## 2023-08-11 VITALS
HEIGHT: 66 IN | OXYGEN SATURATION: 99 % | BODY MASS INDEX: 18.8 KG/M2 | DIASTOLIC BLOOD PRESSURE: 65 MMHG | WEIGHT: 117 LBS | SYSTOLIC BLOOD PRESSURE: 156 MMHG | HEART RATE: 92 BPM

## 2023-08-11 DIAGNOSIS — Z41.9 ENCOUNTER FOR PROCEDURE FOR PURPOSES OTHER THAN REMEDYING HEALTH STATE, UNSPECIFIED: Chronic | ICD-10-CM

## 2023-08-11 DIAGNOSIS — Z78.9 OTHER SPECIFIED HEALTH STATUS: Chronic | ICD-10-CM

## 2023-08-11 DIAGNOSIS — Z98.890 OTHER SPECIFIED POSTPROCEDURAL STATES: Chronic | ICD-10-CM

## 2023-08-11 PROCEDURE — 73502 X-RAY EXAM HIP UNI 2-3 VIEWS: CPT

## 2023-08-11 PROCEDURE — 73502 X-RAY EXAM HIP UNI 2-3 VIEWS: CPT | Mod: 26,LT

## 2023-08-11 PROCEDURE — 99214 OFFICE O/P EST MOD 30 MIN: CPT

## 2023-08-11 RX ORDER — DICLOFENAC SODIUM 1% 10 MG/G
1 GEL TOPICAL
Qty: 100 | Refills: 2 | Status: ACTIVE | COMMUNITY
Start: 2023-08-11 | End: 1900-01-01

## 2023-08-21 NOTE — PHYSICAL EXAM
[de-identified] : General appearance: well nourished and hydrated, pleasant, alert and oriented x 3, cooperative. HEENT: normocephalic, EOM intact, wearing mask, external auditory canal clear. Cardiovascular: no lower leg edema, no varicosities, dorsalis pedis pulses palpable and symmetric. Lymphatics: no palpable lymphadenopathy, no lymphedema. Neurologic: sensation is normal, no muscle weakness in upper or lower extremities, patella tendon reflexes present and symmetric. Dermatologic: skin moist, warm, no rash. Spine: cervical spine with normal lordosis and painless range of motion, thoracic spine with normal kyphosis and painless range of motion, lumbosacral spine with normal lordosis and painless range of motion.  No tenderness to palpation along midline spine and paraspinal musculature.  Sacroiliac joints nontender bilaterally. Negative SLR and crossed SLR tests bilaterally. Gait: He does ambulate using a cane and demonstrates a cautious gait pattern with mild left antalgia.   Limb lengths: clinically equal   Left hip: - Focal soft tissue swelling: none - Ecchymosis: none - Erythema: none - Wounds: Well healed anterior incision, benign appearing - Tenderness: He does have TTP along the outer iliac table about 3 finger breaths distal to the iliac brim directly lateral, though there are no masses there. No fluctuance or induration. - ROM:   - Flexion: 120   - Extension: 0   - Adduction: 30   - Abduction: 45   - Internal rotation in 90 degrees of hip flexion: 20   - External rotation in 90 degrees of hip flexion: 40 - NICO: negative - FADIR: negative - Patrick: negative - Stinchfield: negative - Flexor power: 4+/5 - Abductor power: 4+/5 and symmetric [de-identified] : AP pelvis and left hip radiographs were obtained. These demonstrate stable position of his left total hip arthroplasty as compared to prior imaging without evidence of mechanical complication. Right hip demonstrates stable, minimal osteoarthritis with associated CAM deformity and no radiographic evidence of osteonecrosis.

## 2023-08-21 NOTE — END OF VISIT
[FreeTextEntry3] : Documented by Italo Estes acting as a scribe for Dr. Mook Hart. 08/11/2023   All medical record entries made by the Scribe were at my, Dr. Mook Hart, direction and personally dictated by me on 08/11/2023. I have reviewed the chart and agree that the record accurately reflects my personal performance of the history, physical exam, assessment and plan. I have also personally directed, reviewed, and agreed with the chart.

## 2023-08-21 NOTE — HISTORY OF PRESENT ILLNESS
[de-identified] : L KRISTINA 4/28/22 08/11/2023: 88 y/o male presenting about 16 months status post left total hip arthroplasty. Patient notes an unbalanced gait pattern as well as tenderness and pain along the left iliac crest. He states he's been taking Tylenol medication as needed for pain. He denies any symptoms on the right side. Overall he is unhappy with his functional progress and questions why his gait pattern is so poor despite the hip replacement.  8/12/22: About 3.5mo s/p L KRISTINA. Doing well with respect to the hip but complains of some new shortness of breath with minimal exertion. Has a pulm appointment upcoming.  2/8/22: 85y/o male referred by Dr. Garay for evaluation of left hip pain and stiffness. Symptoms have been escalating for about 2 years. Treatments thus far have included PT, HEP, Tylenol, and activity modification. Continues to have severe left groin pain radiating into the left anterior thigh. Ambulatory tolerance now is about 1-2 blocks without assistive device, limited both by pain and cardiovascular fatigue. Recently obtained a walker but is embarrassed to use it. He is here to discuss KRISTINA.  PMH sig for HTN, HLD, and dyspnea. Dyspnea was evaluated to be secondary to aging and deconditioning as per last note from Dr. Holland. Also has undergone bilateral iliac arterial stents and bilateral inguinal hernia repairs.  He reports that his wife is in poor health and requires assistance with her medical appointments. He helps her to arrange these but is finding it more and more difficult to do so given his own physical restrictions.

## 2023-08-21 NOTE — DISCUSSION/SUMMARY
[de-identified] : 88 y/o male now 16 months status post left total hip arthroplasty with some ongoing gluteal muscular pain. Doing well with respect to the hip though there is some persistent residual gluteal myositis. Also with symptomatic lumbar spinal stenosis.  - His chief complaint upon coming in today was gait abnormality which he was assigning to his hip replacement. After observing his gait pattern, it's clearly a spinal gait pattern, and he does have evidence of severe spondylosis from his prior spine imaging. I suspect that he has advanced lumbar spinal stenosis.  - We briefly discussed the pathophysiology of spinal stenosis and I recommend that he move forward with a lumbar spine MRI and consult with one of our spine colleagues after the MRI has been performed.  - We renewed his referral to physical therapy with more of a low back protocol.  - I recommended that he continue to ambulate daily for exercise and that he use topical Diclofenac for the residual left pelvic pain.  - He will continue to follow-up annually with repeat left hip X-rays or earlier as needed for any new or worsening symptoms.

## 2023-08-24 ENCOUNTER — APPOINTMENT (OUTPATIENT)
Dept: ORTHOPEDIC SURGERY | Facility: CLINIC | Age: 88
End: 2023-08-24
Payer: MEDICARE

## 2023-08-24 VITALS
SYSTOLIC BLOOD PRESSURE: 153 MMHG | WEIGHT: 117.6 LBS | OXYGEN SATURATION: 99 % | BODY MASS INDEX: 19.59 KG/M2 | HEART RATE: 94 BPM | DIASTOLIC BLOOD PRESSURE: 66 MMHG | HEIGHT: 65 IN

## 2023-08-24 DIAGNOSIS — Z96.642 AFTERCARE FOLLOWING JOINT REPLACEMENT SURGERY: ICD-10-CM

## 2023-08-24 DIAGNOSIS — M25.552 PAIN IN LEFT HIP: ICD-10-CM

## 2023-08-24 DIAGNOSIS — Z47.1 AFTERCARE FOLLOWING JOINT REPLACEMENT SURGERY: ICD-10-CM

## 2023-08-24 PROCEDURE — 99215 OFFICE O/P EST HI 40 MIN: CPT

## 2023-08-24 PROCEDURE — 72110 X-RAY EXAM L-2 SPINE 4/>VWS: CPT

## 2023-08-24 NOTE — PHYSICAL EXAM
[Antalgic] : antalgic [Cane] : ambulates with cane [UE/LE] : Sensory: Intact in bilateral upper & lower extremities [Normal Touch] : sensation intact for touch [Normal Proprioception] : sensation intact for proprioception [Normal] : No costovertebral angle tenderness and no spinal tenderness [de-identified] : No focal tenderness over the greater trochanter. [de-identified] : 4 view lumbar x-ray 24 2023 Ortho PACS: Incompletely imaged thoracolumbar dextroscoliosis without considerable rotational deformity.  Degenerative disc disease at all visualized levels.  No evidence of listhesis or instability.  Sagittal alignment is maintained

## 2023-08-24 NOTE — DISCUSSION/SUMMARY
[de-identified] : Dr. ANDERSON has already ordered MRI of the lumbar spine for which she has a appointment later today.  I do recommend this imaging to rule out a referred pain from lumbar radiculopathy.  I also explained he may have some symptoms of neurogenic claudication for which spinal stenosis could be causative.  Once his images are available we will review to discuss appropriate management moving forward.  All questions were answered  I have spent greater than 60 minutes preparing to see the patient, collecting relevant history, performing a thorough history and physical examination, counseling the patient regarding my findings ordering the appropriate therapies and tests, communicating with other relevant healthcare professionals, documenting my encounter and coordinating care.

## 2023-08-24 NOTE — HISTORY OF PRESENT ILLNESS
[de-identified] : 87-year-old male presents as new patient evaluation of left posterior lateral hip pain.  He has a history of left total hip arthroplasty with Dr. ANDERSON from which she has recovered well.  He describes an aching soreness near the left iliac crest into the gluteal musculature that is bothersome most when walking uphill.  He feels that this is what most limits his ambulation.  Typically walks with a cane favoring the left side.  States that when he walks his legs occasionally feel tired and fatigued which prompts him to rest.  Denies any radiating pain numbness tingling weakness in the extremities

## 2023-08-24 NOTE — ASSESSMENT
[FreeTextEntry1] : 37-year-old male status post left total hip arthroplasty with posterior lateral gluteal pain exacerbated with ambulation as well as symptoms of neurogenic claudication

## 2023-08-25 ENCOUNTER — APPOINTMENT (OUTPATIENT)
Dept: ORTHOPEDIC SURGERY | Facility: CLINIC | Age: 88
End: 2023-08-25

## 2023-09-24 ENCOUNTER — NON-APPOINTMENT (OUTPATIENT)
Age: 88
End: 2023-09-24

## 2024-08-16 ENCOUNTER — APPOINTMENT (OUTPATIENT)
Dept: ORTHOPEDIC SURGERY | Facility: CLINIC | Age: 89
End: 2024-08-16

## 2024-08-16 ENCOUNTER — RESULT REVIEW (OUTPATIENT)
Age: 89
End: 2024-08-16

## 2024-08-16 VITALS
HEIGHT: 65 IN | BODY MASS INDEX: 18.63 KG/M2 | SYSTOLIC BLOOD PRESSURE: 144 MMHG | WEIGHT: 111.8 LBS | OXYGEN SATURATION: 99 % | HEART RATE: 73 BPM | DIASTOLIC BLOOD PRESSURE: 65 MMHG

## 2024-08-16 DIAGNOSIS — Z96.642 AFTERCARE FOLLOWING JOINT REPLACEMENT SURGERY: ICD-10-CM

## 2024-08-16 DIAGNOSIS — Z47.1 AFTERCARE FOLLOWING JOINT REPLACEMENT SURGERY: ICD-10-CM

## 2024-08-16 PROCEDURE — 99213 OFFICE O/P EST LOW 20 MIN: CPT

## 2024-08-21 NOTE — DISCUSSION/SUMMARY
[de-identified] : 88-year-old male, about two years status plus left hip replacement, with ongoing left sacroiliitis and clinically apparent lumbar spinal stenosis.   - I provided him with a new home exercise program. - I recommended that he use Tylenol, Celebrex, and topical diclofenac as needed. I referred him to pain management to consider left sacroiliac joint injections. - He'll continue to follow up annually with repeat left hip x-rays earlier as needed for any new or worsening symptoms.

## 2024-08-21 NOTE — HISTORY OF PRESENT ILLNESS
[de-identified] : L KRISTINA 4/28/22 8/16/24: 88-year-old male following up now approximately two years status post left total hip replacement. He reports that he's still experiencing some lateral pelvic and left lateral hip pain. Worsens particularly by going up hills and upstairs. He reports that walking on level ground and going down hills is not so symptomatic. He continues to ambulate with a cane at all times. He's not currently taking anything for pain or inflammation but would be interested in starting a new regimen. Primarily he's just been using rubbing alcohol over the area without much relief.  08/11/2023: 88 y/o male presenting about 16 months status post left total hip arthroplasty. Patient notes an unbalanced gait pattern as well as tenderness and pain along the left iliac crest. He states he's been taking Tylenol medication as needed for pain. He denies any symptoms on the right side. Overall he is unhappy with his functional progress and questions why his gait pattern is so poor despite the hip replacement.  8/12/22: About 3.5mo s/p L KRISTINA. Doing well with respect to the hip but complains of some new shortness of breath with minimal exertion. Has a pulm appointment upcoming.  2/8/22: 85y/o male referred by Dr. Garay for evaluation of left hip pain and stiffness. Symptoms have been escalating for about 2 years. Treatments thus far have included PT, HEP, Tylenol, and activity modification. Continues to have severe left groin pain radiating into the left anterior thigh. Ambulatory tolerance now is about 1-2 blocks without assistive device, limited both by pain and cardiovascular fatigue. Recently obtained a walker but is embarrassed to use it. He is here to discuss KRISTINA.  PMH sig for HTN, HLD, and dyspnea. Dyspnea was evaluated to be secondary to aging and deconditioning as per last note from Dr. Holland. Also has undergone bilateral iliac arterial stents and bilateral inguinal hernia repairs.  He reports that his wife is in poor health and requires assistance with her medical appointments. He helps her to arrange these but is finding it more and more difficult to do so given his own physical restrictions.

## 2024-08-21 NOTE — ADDENDUM
[FreeTextEntry1] :   Documented by Harjeet Allison acting as a scribe for Dr. Mook Hart. ~EncMMDONAVAN~

## 2024-08-21 NOTE — PHYSICAL EXAM
[de-identified] : General appearance: well nourished and hydrated, pleasant, alert and oriented x 3, cooperative.   HEENT: normocephalic, EOM intact, wearing mask, external auditory canal clear.   Cardiovascular: no lower leg edema, no varicosities, dorsalis pedis pulses palpable and symmetric.   Lymphatics: no palpable lymphadenopathy, no lymphedema.   Neurologic: sensation is normal, no muscle weakness in upper or lower extremities, patella tendon reflexes present and symmetric.   Dermatologic: skin moist, warm, no rash.   Spine: cervical spine with normal lordosis and painless range of motion, thoracic spine with increased kyphosis and limited range of motion, lumbosacral spine with reduced lordosis and limited range of motion.  Gait: presents w/ cane. W/o cane, demonstrates cautious spinal gait pattern as well as mild left-sided limp. Maintains both knees at least 20 degrees flexion throughout gait cycle  Limb lengths: clinically equal  Left hip: - Focal soft tissue swelling: none - Ecchymosis: none - Erythema: none - Wounds: well-healed anterior incision, benign-appearing - Tenderness: L sacroiliac joint TTP - ROM:    - Flexion: 100   - Extension: 0   - Adduction: 20   - Abduction: 40   - Internal rotation in 90 degrees of hip flexion: 15   - External rotation in 90 degrees of hip flexion: 45 - NICO: negative - FADIR: negative - Patrick: negative - Stinchfield: negative - Flexor power: 4/5 - Abductor power: 4+/5, strength somewhat diminished compared to contralateral side [de-identified] : 2 views (frog lateral and false profile) of the left hip were interpreted by me and reviewed with the patient.  Location of imaging: [facility] Date of exam: 8/16/2024  Pelvic alignment: persistently outlet   Right hip -- Arthritis: none Deformity: none Osteonecrosis: none  Left hip -- Stable appearance of left hip replacement as compared to prior imaging w/o evidence of mechanical complication.

## 2024-08-21 NOTE — PHYSICAL EXAM
[de-identified] : General appearance: well nourished and hydrated, pleasant, alert and oriented x 3, cooperative.   HEENT: normocephalic, EOM intact, wearing mask, external auditory canal clear.   Cardiovascular: no lower leg edema, no varicosities, dorsalis pedis pulses palpable and symmetric.   Lymphatics: no palpable lymphadenopathy, no lymphedema.   Neurologic: sensation is normal, no muscle weakness in upper or lower extremities, patella tendon reflexes present and symmetric.   Dermatologic: skin moist, warm, no rash.   Spine: cervical spine with normal lordosis and painless range of motion, thoracic spine with increased kyphosis and limited range of motion, lumbosacral spine with reduced lordosis and limited range of motion.  Gait: presents w/ cane. W/o cane, demonstrates cautious spinal gait pattern as well as mild left-sided limp. Maintains both knees at least 20 degrees flexion throughout gait cycle  Limb lengths: clinically equal  Left hip: - Focal soft tissue swelling: none - Ecchymosis: none - Erythema: none - Wounds: well-healed anterior incision, benign-appearing - Tenderness: L sacroiliac joint TTP - ROM:    - Flexion: 100   - Extension: 0   - Adduction: 20   - Abduction: 40   - Internal rotation in 90 degrees of hip flexion: 15   - External rotation in 90 degrees of hip flexion: 45 - NICO: negative - FADIR: negative - Patrick: negative - Stinchfield: negative - Flexor power: 4/5 - Abductor power: 4+/5, strength somewhat diminished compared to contralateral side [de-identified] : 2 views (frog lateral and false profile) of the left hip were interpreted by me and reviewed with the patient.  Location of imaging: [facility] Date of exam: 8/16/2024  Pelvic alignment: persistently outlet   Right hip -- Arthritis: none Deformity: none Osteonecrosis: none  Left hip -- Stable appearance of left hip replacement as compared to prior imaging w/o evidence of mechanical complication.

## 2024-08-21 NOTE — DISCUSSION/SUMMARY
[de-identified] : 88-year-old male, about two years status plus left hip replacement, with ongoing left sacroiliitis and clinically apparent lumbar spinal stenosis.   - I provided him with a new home exercise program. - I recommended that he use Tylenol, Celebrex, and topical diclofenac as needed. I referred him to pain management to consider left sacroiliac joint injections. - He'll continue to follow up annually with repeat left hip x-rays earlier as needed for any new or worsening symptoms.

## 2024-08-21 NOTE — END OF VISIT
[FreeTextEntry3] :   Documented by Harjeet Allison acting as a scribe for Dr. Mook Hart. ~EncMMDDY~   All medical record entries made by the Harjeet Allison (Scribe) were at my, Dr. Mook Hart, direction and personally dictated by me on ~EncMMDDY~. I have reviewed the chart and agree that the record accurately reflects my personal performance of the history, physical exam, assessment and plan. I have also personally directed, reviewed, and agreed with the chart.

## 2024-08-21 NOTE — HISTORY OF PRESENT ILLNESS
[de-identified] : L KRISTINA 4/28/22 8/16/24: 88-year-old male following up now approximately two years status post left total hip replacement. He reports that he's still experiencing some lateral pelvic and left lateral hip pain. Worsens particularly by going up hills and upstairs. He reports that walking on level ground and going down hills is not so symptomatic. He continues to ambulate with a cane at all times. He's not currently taking anything for pain or inflammation but would be interested in starting a new regimen. Primarily he's just been using rubbing alcohol over the area without much relief.  08/11/2023: 86 y/o male presenting about 16 months status post left total hip arthroplasty. Patient notes an unbalanced gait pattern as well as tenderness and pain along the left iliac crest. He states he's been taking Tylenol medication as needed for pain. He denies any symptoms on the right side. Overall he is unhappy with his functional progress and questions why his gait pattern is so poor despite the hip replacement.  8/12/22: About 3.5mo s/p L KRISTINA. Doing well with respect to the hip but complains of some new shortness of breath with minimal exertion. Has a pulm appointment upcoming.  2/8/22: 87y/o male referred by Dr. Garay for evaluation of left hip pain and stiffness. Symptoms have been escalating for about 2 years. Treatments thus far have included PT, HEP, Tylenol, and activity modification. Continues to have severe left groin pain radiating into the left anterior thigh. Ambulatory tolerance now is about 1-2 blocks without assistive device, limited both by pain and cardiovascular fatigue. Recently obtained a walker but is embarrassed to use it. He is here to discuss KRISTINA.  PMH sig for HTN, HLD, and dyspnea. Dyspnea was evaluated to be secondary to aging and deconditioning as per last note from Dr. Holland. Also has undergone bilateral iliac arterial stents and bilateral inguinal hernia repairs.  He reports that his wife is in poor health and requires assistance with her medical appointments. He helps her to arrange these but is finding it more and more difficult to do so given his own physical restrictions.

## 2024-12-10 ENCOUNTER — RX RENEWAL (OUTPATIENT)
Age: 89
End: 2024-12-10

## 2025-03-07 ENCOUNTER — RX RENEWAL (OUTPATIENT)
Age: 89
End: 2025-03-07

## 2025-07-30 ENCOUNTER — NON-APPOINTMENT (OUTPATIENT)
Age: 89
End: 2025-07-30

## 2025-07-30 ENCOUNTER — RESULT REVIEW (OUTPATIENT)
Age: 89
End: 2025-07-30

## 2025-07-30 ENCOUNTER — OUTPATIENT (OUTPATIENT)
Dept: OUTPATIENT SERVICES | Facility: HOSPITAL | Age: 89
LOS: 1 days | End: 2025-07-30
Payer: MEDICARE

## 2025-07-30 ENCOUNTER — APPOINTMENT (OUTPATIENT)
Dept: ORTHOPEDIC SURGERY | Facility: CLINIC | Age: 89
End: 2025-07-30
Payer: MEDICARE

## 2025-07-30 VITALS
WEIGHT: 111 LBS | OXYGEN SATURATION: 99 % | TEMPERATURE: 97.1 F | HEIGHT: 65 IN | BODY MASS INDEX: 18.49 KG/M2 | HEART RATE: 71 BPM | SYSTOLIC BLOOD PRESSURE: 154 MMHG | DIASTOLIC BLOOD PRESSURE: 68 MMHG

## 2025-07-30 DIAGNOSIS — Z96.642 AFTERCARE FOLLOWING JOINT REPLACEMENT SURGERY: ICD-10-CM

## 2025-07-30 DIAGNOSIS — Z41.9 ENCOUNTER FOR PROCEDURE FOR PURPOSES OTHER THAN REMEDYING HEALTH STATE, UNSPECIFIED: Chronic | ICD-10-CM

## 2025-07-30 DIAGNOSIS — Z78.9 OTHER SPECIFIED HEALTH STATUS: Chronic | ICD-10-CM

## 2025-07-30 DIAGNOSIS — M46.1 SACROILIITIS, NOT ELSEWHERE CLASSIFIED: ICD-10-CM

## 2025-07-30 DIAGNOSIS — Z47.1 AFTERCARE FOLLOWING JOINT REPLACEMENT SURGERY: ICD-10-CM

## 2025-07-30 DIAGNOSIS — Z98.890 OTHER SPECIFIED POSTPROCEDURAL STATES: Chronic | ICD-10-CM

## 2025-07-30 DIAGNOSIS — M70.62 TROCHANTERIC BURSITIS, LEFT HIP: ICD-10-CM

## 2025-07-30 PROCEDURE — 99214 OFFICE O/P EST MOD 30 MIN: CPT

## 2025-07-30 PROCEDURE — 73502 X-RAY EXAM HIP UNI 2-3 VIEWS: CPT | Mod: 26,LT

## 2025-07-30 PROCEDURE — 73502 X-RAY EXAM HIP UNI 2-3 VIEWS: CPT

## 2025-07-30 RX ORDER — DICLOFENAC SODIUM 10 MG/G
1 GEL TOPICAL
Qty: 100 | Refills: 2 | Status: ACTIVE | COMMUNITY
Start: 2025-07-30 | End: 1900-01-01

## 2025-08-11 ENCOUNTER — NON-APPOINTMENT (OUTPATIENT)
Age: 89
End: 2025-08-11

## 2025-08-11 ENCOUNTER — APPOINTMENT (OUTPATIENT)
Dept: MRI IMAGING | Facility: CLINIC | Age: 89
End: 2025-08-11

## 2025-08-15 ENCOUNTER — APPOINTMENT (OUTPATIENT)
Dept: ORTHOPEDIC SURGERY | Facility: CLINIC | Age: 89
End: 2025-08-15

## 2025-08-28 ENCOUNTER — APPOINTMENT (OUTPATIENT)
Dept: PAIN MANAGEMENT | Facility: CLINIC | Age: 89
End: 2025-08-28

## 2025-08-28 VITALS
WEIGHT: 111 LBS | BODY MASS INDEX: 18.49 KG/M2 | HEART RATE: 71 BPM | DIASTOLIC BLOOD PRESSURE: 75 MMHG | HEIGHT: 65 IN | SYSTOLIC BLOOD PRESSURE: 161 MMHG | OXYGEN SATURATION: 95 %

## 2025-08-28 DIAGNOSIS — M54.16 RADICULOPATHY, LUMBAR REGION: ICD-10-CM

## 2025-08-28 DIAGNOSIS — M47.816 SPONDYLOSIS W/OUT MYELOPATHY OR RADICULOPATHY, LUMBAR REGION: ICD-10-CM

## 2025-08-28 PROCEDURE — 99205 OFFICE O/P NEW HI 60 MIN: CPT

## 2025-08-28 RX ORDER — CELECOXIB 100 MG/1
100 CAPSULE ORAL TWICE DAILY
Qty: 60 | Refills: 0 | Status: ACTIVE | COMMUNITY
Start: 2025-08-28 | End: 1900-01-01

## (undated) DEVICE — SUT VICRYL 2-0 27" CT-1 UNDYED

## (undated) DEVICE — SPECIMEN CONTAINER 4OZ

## (undated) DEVICE — GLV 7.5 PROTEXIS (WHITE)

## (undated) DEVICE — WARMING BLANKET UPPER ADULT

## (undated) DEVICE — GLV 7 PROTEXIS (WHITE)

## (undated) DEVICE — SUT STRATAFIX SPIRAL PDO 2-0 36CM MH

## (undated) DEVICE — DRAPE TOWEL BLUE 17" X 24"

## (undated) DEVICE — NDL HYPO SAFE 22G X 1.5" (BLACK)

## (undated) DEVICE — DRAPE LIGHT HANDLE COVER (GREEN)

## (undated) DEVICE — ELCTR AQUAMANTYS BIPOLAR SEALER 6.0

## (undated) DEVICE — SUT STRATAFIX SPIRAL PDO 1 30CM OS-6

## (undated) DEVICE — SAW BLADE STRYKER SAGITTAL 81.5X12.5X1.19MM

## (undated) DEVICE — DRSG AQUACEL 3.5 X 10"

## (undated) DEVICE — HOOD FLYTE STRYKER HELMET SHIELD

## (undated) DEVICE — SUT ETHIBOND 1 30" OS8

## (undated) DEVICE — DRSG DERMABOND PRINEO 22CM

## (undated) DEVICE — DRAPE LIGHT HANDLE COVER (BLUE)

## (undated) DEVICE — PACK TOTAL HIP (2 PACKS)

## (undated) DEVICE — PACK ANTERIOR HIP ACSRY LNX SURGICOUNT

## (undated) DEVICE — SUT VICRYL 0 36" CT-1 UNDYED

## (undated) DEVICE — WOUND IRR IRRISEPT W 0.5 CHG